# Patient Record
Sex: FEMALE | Race: BLACK OR AFRICAN AMERICAN | NOT HISPANIC OR LATINO | Employment: OTHER | URBAN - NONMETROPOLITAN AREA
[De-identification: names, ages, dates, MRNs, and addresses within clinical notes are randomized per-mention and may not be internally consistent; named-entity substitution may affect disease eponyms.]

---

## 2018-01-08 ENCOUNTER — OFFICE VISIT CONVERTED (OUTPATIENT)
Dept: FAMILY MEDICINE CLINIC | Age: 58
End: 2018-01-08
Attending: FAMILY MEDICINE

## 2018-10-23 ENCOUNTER — OFFICE VISIT CONVERTED (OUTPATIENT)
Dept: FAMILY MEDICINE CLINIC | Age: 58
End: 2018-10-23
Attending: FAMILY MEDICINE

## 2018-10-30 ENCOUNTER — OFFICE VISIT CONVERTED (OUTPATIENT)
Dept: FAMILY MEDICINE CLINIC | Age: 58
End: 2018-10-30
Attending: FAMILY MEDICINE

## 2019-02-14 ENCOUNTER — OFFICE VISIT CONVERTED (OUTPATIENT)
Dept: FAMILY MEDICINE CLINIC | Age: 59
End: 2019-02-14
Attending: FAMILY MEDICINE

## 2020-02-06 ENCOUNTER — OFFICE VISIT CONVERTED (OUTPATIENT)
Dept: FAMILY MEDICINE CLINIC | Age: 60
End: 2020-02-06
Attending: FAMILY MEDICINE

## 2021-02-08 ENCOUNTER — OFFICE VISIT CONVERTED (OUTPATIENT)
Dept: FAMILY MEDICINE CLINIC | Age: 61
End: 2021-02-08
Attending: FAMILY MEDICINE

## 2021-02-08 ENCOUNTER — HOSPITAL ENCOUNTER (OUTPATIENT)
Dept: OTHER | Facility: HOSPITAL | Age: 61
Discharge: HOME | End: 2021-02-08
Attending: FAMILY MEDICINE

## 2021-02-08 LAB
ALBUMIN SERPL-MCNC: 4 G/DL (ref 3.5–5)
ALBUMIN/GLOB SERPL: 1.1 {RATIO} (ref 1.4–2.6)
ALP SERPL-CCNC: 75 U/L (ref 43–160)
ALT SERPL-CCNC: 27 U/L (ref 10–40)
ANION GAP SERPL CALC-SCNC: 13 MMOL/L (ref 8–19)
AST SERPL-CCNC: 19 U/L (ref 15–50)
BILIRUB SERPL-MCNC: 0.18 MG/DL (ref 0.2–1.3)
BUN SERPL-MCNC: 17 MG/DL (ref 5–25)
BUN/CREAT SERPL: 15 {RATIO} (ref 6–20)
CALCIUM SERPL-MCNC: 9.6 MG/DL (ref 8.7–10.4)
CHLORIDE SERPL-SCNC: 105 MMOL/L (ref 99–111)
CONV CO2: 26 MMOL/L (ref 22–32)
CONV TOTAL PROTEIN: 7.7 G/DL (ref 6.3–8.2)
CREAT UR-MCNC: 1.11 MG/DL (ref 0.5–0.9)
ERYTHROCYTE [DISTWIDTH] IN BLOOD BY AUTOMATED COUNT: 14.6 % (ref 11.5–14.5)
FERRITIN SERPL-MCNC: 283 NG/ML (ref 10–200)
FOLATE SERPL-MCNC: 9.3 NG/ML (ref 4.8–20)
GFR SERPLBLD BASED ON 1.73 SQ M-ARVRAT: >60 ML/MIN/{1.73_M2}
GLOBULIN UR ELPH-MCNC: 3.7 G/DL (ref 2–3.5)
GLUCOSE SERPL-MCNC: 94 MG/DL (ref 65–99)
HBA1C MFR BLD: 13.3 G/DL (ref 12–16)
HCT VFR BLD AUTO: 41.3 % (ref 37–47)
IRON SATN MFR SERPL: 25 % (ref 20–55)
IRON SERPL-MCNC: 71 UG/DL (ref 60–170)
MCH RBC QN AUTO: 26.7 PG (ref 27–31)
MCHC RBC AUTO-ENTMCNC: 32.2 G/DL (ref 33–37)
MCV RBC AUTO: 82.8 FL (ref 81–99)
OSMOLALITY SERPL CALC.SUM OF ELEC: 291 MOSM/KG (ref 273–304)
PLATELET # BLD AUTO: 321 10*3/UL (ref 130–400)
PMV BLD AUTO: 9.5 FL (ref 7.4–10.4)
POTASSIUM SERPL-SCNC: 4 MMOL/L (ref 3.5–5.3)
RBC # BLD AUTO: 4.99 10*6/UL (ref 4.2–5.4)
RPR SER QL: NORMAL
SODIUM SERPL-SCNC: 140 MMOL/L (ref 135–147)
T4 FREE SERPL-MCNC: 1.1 NG/DL (ref 0.9–1.8)
TIBC SERPL-MCNC: 285 UG/DL (ref 245–450)
TRANSFERRIN SERPL-MCNC: 199 MG/DL (ref 250–380)
TSH SERPL-ACNC: 0.84 M[IU]/L (ref 0.27–4.2)
VIT B12 SERPL-MCNC: 1072 PG/ML (ref 211–911)
WBC # BLD AUTO: 5.16 10*3/UL (ref 4.8–10.8)

## 2021-02-09 LAB — B BURGDOR IGG+IGM SER-ACNC: <0.91 ISR (ref 0–0.9)

## 2021-03-22 ENCOUNTER — HOSPITAL ENCOUNTER (OUTPATIENT)
Dept: OTHER | Facility: HOSPITAL | Age: 61
Discharge: HOME | End: 2021-03-22
Attending: FAMILY MEDICINE

## 2021-03-22 LAB
CREAT BLD-MCNC: 1.1 MG/DL (ref 0.6–1.4)
GFR SERPLBLD BASED ON 1.73 SQ M-ARVRAT: >60 ML/MIN/{1.73_M2}

## 2021-05-14 ENCOUNTER — HOSPITAL ENCOUNTER (OUTPATIENT)
Dept: OTHER | Facility: HOSPITAL | Age: 61
Discharge: HOME | End: 2021-05-14
Attending: FAMILY MEDICINE

## 2021-05-14 ENCOUNTER — OFFICE VISIT CONVERTED (OUTPATIENT)
Dept: FAMILY MEDICINE CLINIC | Age: 61
End: 2021-05-14
Attending: FAMILY MEDICINE

## 2021-05-18 NOTE — PROGRESS NOTES
Cindy DengPatricia 1960     Office/Outpatient Visit    Visit Date: Tue, Oct 30, 2018 01:10 pm    Provider: Alex Coe MD (Assistant: Sherri Sheldon RN)    Location: [facility]        Electronically signed by Alex Coe MD on  11/01/2018 07:28:27 AM                             SUBJECTIVE:        CC: 'I'm having problems with hemorrhoids'         HPI:     Cindy is in today for follow up on hemorrhoids.  She has had an issue with them for a long period of time.  However, they do seem to be worsening.  She says that her most recent colonoscopy was this year.  She has not previously had any kind of surgical treatment of hemorrhoids.  She says that they are tending to stay longer and be more painful.         Cindy has also noted some issue with her memory.  She feels like she is having more of a problem than she should.  She finds that she struggles with words.  She denies issues with head injuries.  Cindy says that memory issues do not run in the family.  This has been more of an issue this year.  She is struggling to remember simple things.  She struggles with finding words.  She denies difficulty with speech or swallowing.  She denies numbness or weakness on one side or the other.      ROS:     CONSTITUTIONAL:  Negative for chills and fever.      CARDIOVASCULAR:  Negative for chest pain and palpitations.      RESPIRATORY:  Negative for recent cough and dyspnea.      GASTROINTESTINAL:  Negative for abdominal pain, nausea and vomiting.      INTEGUMENTARY/BREAST:  Negative for atypical mole(s) and rash.      PSYCHIATRIC:  Negative for anxiety and depression.          PM/[facility]/:     Last Reviewed on 10/30/2018 01:43 PM by Alex Coe    Past Medical History:         Hypertension     Depression    Anxiety         Surgical History:         5th Metatarsal; Procedures: colonoscopy 2012 - hemorrhoid - no polyps         Family History:         Positive for Colon Cancer ( father )  and Prostate Cancer ( father ).      Positive for Renal Failure ( mother ).          Social History:     Occupation: Retired (Prior occupation: [employer])     Marital Status:      Children: 1 child         Tobacco/Alcohol/Supplements:     Last Reviewed on 10/30/2018 01:43 PM by Alex Coe    Tobacco: She has a past history of cigarette smoking; quit date:  1990s.          Alcohol: Frequency:    'I drink wine';         Substance Abuse History:     Last Reviewed on 10/30/2018 01:43 PM by Alex Coe    NEGATIVE         Mental Health History:     Last Reviewed on 10/30/2018 01:43 PM by Alex Coe        Communicable Diseases (eg STDs):     Last Reviewed on 10/30/2018 01:43 PM by Alex Coe            Current Problems:     Last Reviewed on 10/30/2018 01:43 PM by Alex Coe    Memory loss     Hemorrhoids, external     Cellulitis of the face     Use of high risk medications     Depression     Hypertension         Immunizations:     Fluarix pf 1/14/2013     Fluzone Quadrivalent (3+ years) 10/23/2018         Allergies:     Last Reviewed on 10/30/2018 01:43 PM by Alex Coe      No Known Drug Allergies.     Lisinopril: cough (Adverse Reaction)        Current Medications:     Last Reviewed on 10/30/2018 01:43 PM by Alex Coe    Wellbutrin XL 150mg Tablets, Extended Release Take 1 tablet(s) by mouth daily     Amlodipine  5mg Tablet Take 1 tablet(s) by mouth daily     Losartan 50mg Tablet     Trazodone HCl 100mg Tablet         OBJECTIVE:        Vitals:         Current: 10/30/2018 1:14:38 PM    Ht:  5 ft, 5 in;  Wt: 179 lbs;  BMI: 29.8    T: 97.9 F (oral);  BP: 141/84 mm Hg (left arm, sitting);  P: 57 bpm (left arm (BP Cuff), sitting);  sCr: 0.9 mg/dL;  GFR: 71.99        Exams:     PHYSICAL EXAM:     GENERAL: vital signs recorded - well developed, well nourished;  no apparent distress;     EYES: extraocular movements intact; conjunctiva and  cornea are normal; PERRLA;     E/N/T:  normal EACs, TMs, nasal/oral mucosa, teeth, gingiva, and oropharynx;     NECK: range of motion is normal; thyroid is non-palpable;     RESPIRATORY: normal respiratory rate and pattern with no distress; normal breath sounds with no rales, rhonchi, wheezes or rubs;     CARDIOVASCULAR: normal rate; rhythm is regular;  no systolic murmur; no edema;     GASTROINTESTINAL: nontender; normal bowel sounds; no organomegaly;     LYMPHATIC: no enlargement of cervical or facial nodes; no supraclavicular nodes;     BREAST/INTEGUMENT: SKIN: no significant rashes or lesions; no suspicious moles;     NEUROLOGIC: mental status: alert and oriented x 3; cranial nerves II-XII grossly intact; there is not any focal weakness;     PSYCHIATRIC: appropriate affect and demeanor; normal psychomotor function;         ASSESSMENT           455.3   K64.8  Hemorrhoids, external              DDx:     780.93   R41.3  Memory loss              DDx:     784.3   R47.01  Verbal aphasia              DDx:         ORDERS:         Radiology/Test Orders:       60014  MRI, brain; with contrast  (Send-Out)         82328  MRI, brain; without contrast  (Send-Out)           Lab Orders:       94823  B12FO - [facility] Vitamin B12 with Folate  (Send-Out)         75461  BDCB2 - [facility] CBC w/o diff  (Send-Out)         00498  COMP - [facility] Comp. Metabolic Panel  (Send-Out)         24558  RPR - [facility] RPR, Rfx Qn RPR/Confirm TP  (Send-Out)         15447  TSH - [facility] TSH  (Send-Out)           Procedures Ordered:       REFER  Referral to Specialist or Other Facility  (Send-Out)           Other Orders:         Calculated BMI above the upper parameter and a follow-up plan was documented in the medical record  (In-House)                   PLAN:          Hemorrhoids, external         REFERRALS:  Referral initiated to a general surgeon ( Dr. Wally Tello ).      RECOMMENDATIONS given include: Today, we have reviewed her care.  We will refer her for  evaluation on the hemorrhoids.  She is already doing the simple things that we would otherwise recommend.  The memory and speech issues are more concerning.  There is not an obvious cause on exam or from her medications.  We will move forward with labs and MRI of the brain as noted.  Given her concern, consider referral to neurology depending on what the testing shows..  MIPS     BMI Elevated - Follow-Up Plan: She was provided education on weight loss strategies           Orders:       REFER  Referral to Specialist or Other Facility  (Send-Out)           Calculated BMI above the upper parameter and a follow-up plan was documented in the medical record  (In-House)             Patient Education Handouts:       Hemorrhoids           Memory loss     LABORATORY:  Labs ordered to be performed today include B12 with Folate, CBC W/O DIFF, Comprehensive metabolic panel, RPR, and TSH.            Orders:       86032  B12FO - [facility] Vitamin B12 with Folate  (Send-Out)         19339  BDCB2 - [facility] CBC w/o diff  (Send-Out)         58289  COMP - [facility] Comp. Metabolic Panel  (Send-Out)         91056  RPR - [facility] RPR, Rfx Qn RPR/Confirm TP  (Send-Out)         72401  TSH - [facility] TSH  (Send-Out)            Verbal aphasia         RADIOLOGY:  I have ordered a head MRI with contrast without contrast to be done today.            Orders:       67637  MRI, brain; with contrast  (Send-Out)         53306  MRI, brain; without contrast  (Send-Out)               CHARGE CAPTURE           **Please note: ICD descriptions below are intended for billing purposes only and may not represent clinical diagnoses**        Primary Diagnosis:         455.3 Hemorrhoids, external            K64.8    Other hemorrhoids              Orders:          56405   Office/outpatient visit; established patient, level 4  (In-House)                Calculated BMI above the upper parameter and a follow-up plan was documented in the medical record  (In-House)           780.93  Memory loss            R41.3    Other amnesia    784.3 Verbal aphasia            R47.01    Aphasia        ADDENDUMS:      ____________________________________    Addendum: 11/01/2018 08:17 AM - Priscila Tran         Visit Note Faxed to:        Wally Tello  (General Surgery); Number [phone]            Date: 11/12/2018 01:32 PM    Author: Bernadette Medina         Visit Note Faxed to:        Luis Lyles (Neurology); Number [phone]

## 2021-05-18 NOTE — PROGRESS NOTES
Cindy DengPatricia 1960     Office/Outpatient Visit    Visit Date: Mon, Jan 8, 2018 11:46 am    Provider: Alex Coe MD (Assistant: Deana Maurice LPN)    Location: [facility]        Electronically signed by Alex Coe MD on  01/08/2018 07:44:58 PM                             SUBJECTIVE:        CC: 'referral for my eyes'         HPI:     Cindy is in today for evaluation of her L eye.  She has noted some discomfort in the L eye.  This has been an issue for 5-6 months.  She says that she feels like she has to close the eye due to some irritation several times per day.  It bothers her when she drives.  She has some concern that her vision is off in the eye.  She did have eye exam less than a year ago.      ROS:     CONSTITUTIONAL:  Negative for chills and fever.      CARDIOVASCULAR:  Negative for chest pain and palpitations.      RESPIRATORY:  Negative for recent cough and dyspnea.      GASTROINTESTINAL:  Negative for abdominal pain, nausea and vomiting.          PM/[facility]/:     Last Reviewed on 1/08/2018 11:53 AM by Alex Coe    Past Medical History:         Hypertension     Depression    Anxiety         Surgical History:         5th Metatarsal; Procedures: colonoscopy 2012 - hemorrhoid - no polyps         Family History:         Positive for Colon Cancer ( father ) and Prostate Cancer ( father ).      Positive for Renal Failure ( mother ).          Social History:     Occupation: Retired (Prior occupation: [employer])     Marital Status:      Children: 1 child         Tobacco/Alcohol/Supplements:     Last Reviewed on 1/08/2018 11:53 AM by Alex Ceo    Tobacco: She has a past history of cigarette smoking; quit date:  1990s.          Alcohol: Frequency:    'I drink wine';         Substance Abuse History:     Last Reviewed on 1/08/2018 11:53 AM by Alex Coe    NEGATIVE         Mental Health History:     Last Reviewed on 1/08/2018 11:53 AM by  Alex Coe        Communicable Diseases (eg STDs):     Last Reviewed on 1/08/2018 11:53 AM by Alex Coe            Current Problems:     Last Reviewed on 1/08/2018 11:53 AM by Alex Coe    Use of high risk medications     Depression     Hypertension         Immunizations:     Fluarix pf 1/14/2013         Allergies:     Last Reviewed on 1/08/2018 11:53 AM by Alex Coe      No Known Drug Allergies.     Lisinopril: cough (Adverse Reaction)        Current Medications:     Last Reviewed on 1/08/2018 11:53 AM by Alex Coe    Wellbutrin XL 150mg Tablets, Extended Release Take 1 tablet(s) by mouth daily     Ibuprofen 800mg Tablet Take 1 tablet(s) by mouth q 8hr prn     Amlodipine  5mg Tablet Take 1 tablet(s) by mouth daily     Melatonin 3mg Tablet prn     Losartan 50mg Tablet     Trazodone HCl 100mg Tablet         OBJECTIVE:        Vitals:         Current: 1/8/2018 11:51:02 AM    Ht:  5 ft, 5 in;  Wt: 175.3 lbs;  BMI: 29.2    T: 98.2 F (oral);  BP: 138/89 mm Hg (left arm, sitting);  P: 62 bpm (left arm (BP Cuff), sitting);  sCr: 0.9 mg/dL;  GFR: 72.22        Exams:     PHYSICAL EXAM:     GENERAL: vital signs recorded - well developed, well nourished;  no apparent distress;     EYES: extraocular movements intact; conjunctiva and cornea are normal; PERRLA;     E/N/T:  normal EACs, TMs, nasal/oral mucosa, teeth, gingiva, and oropharynx;     NECK: range of motion is normal; thyroid exam reveals enlargement;     RESPIRATORY: normal respiratory rate and pattern with no distress; normal breath sounds with no rales, rhonchi, wheezes or rubs;     CARDIOVASCULAR: normal rate; rhythm is regular;  no systolic murmur; no edema;     GASTROINTESTINAL: nontender; normal bowel sounds; no organomegaly;     BREAST/INTEGUMENT: SKIN: no significant rashes or lesions; no suspicious moles;         ASSESSMENT           379.91   H57.12  Eye pain              DDx:         ORDERS:          Radiology/Test Orders:       2022F  Dilated retinal eye exam w/interpret by ophthalmologist/optometrist documented/reviewed (DM)4  (In-House)           Procedures Ordered:       REFER  Referral to Specialist or Other Facility  (Send-Out)                   PLAN:          Eye pain         REFERRALS:  Referral initiated to Optometrist Tono.      RECOMMENDATIONS given include: Cindy is doing well at this time.  We will refer her for evaluation of the eye.  Consider labs or other evaluation depending on Dr. Power's evaluation..            Orders:       REFER  Referral to Specialist or Other Facility  (Send-Out)         2022F  Dilated retinal eye exam w/interpret by ophthalmologist/optometrist documented/reviewed (DM)4  (In-House)             Patient Education Handouts:       [facility] Medication Compliance              Patient Recommendations:        For  Eye pain:     I also recommend Tono.              CHARGE CAPTURE           **Please note: ICD descriptions below are intended for billing purposes only and may not represent clinical diagnoses**        Primary Diagnosis:         379.91 Eye pain            H57.12    Ocular pain, left eye              Orders:          91154   Office/outpatient visit; established patient, level 3  (In-House)             2022F   Dilated retinal eye exam w/interpret by ophthalmologist/optometrist documented/reviewed (DM)4  (In-House)

## 2021-05-18 NOTE — PROGRESS NOTES
Cindy Deng 1960     Office/Outpatient Visit    Visit Date: Tue, Oct 23, 2018 11:13 am    Provider: Crut Higgins,   (Assistant: Kelly Lucas MA)    Location: [facility]        Electronically signed by Curt Higgins,   on  10/23/2018 06:11:30 PM                             SUBJECTIVE:        CC:     Mrs. Deng is a 58 year old Black or  female.  She presents with Left eye swollen & matted shut this am. Pt stated that she has not done anything different.  (DISCUSS FLU VACCINE)         HPI:     Left upper eyelid is swollen, vision is slightly blurry but otherwise unaffected. No pain in eye and no diplopia.     ROS:     CONSTITUTIONAL:  Negative for fatigue and fever.      EYES:  Positive for blurred vision ( left eye ).      E/N/T:  Negative for diminished hearing and nasal congestion.      CARDIOVASCULAR:  Negative for chest pain and palpitations.      RESPIRATORY:  Negative for recent cough and dyspnea.      GASTROINTESTINAL:  Negative for abdominal pain, constipation, diarrhea, nausea and vomiting.      GENITOURINARY:  Negative for dysuria and urinary incontinence.      MUSCULOSKELETAL:  Negative for arthralgias and myalgias.      INTEGUMENTARY/BREAST:  Negative for atypical mole(s) and rash.      NEUROLOGICAL:  Negative for paresthesias and weakness.      PSYCHIATRIC:  Negative for anxiety, depression and sleep disturbance.          [facility]/FM/:     Last Reviewed on 1/08/2018 11:53 AM by Alex Coe    Past Medical History:         Hypertension     Depression    Anxiety         Surgical History:         5th Metatarsal; Procedures: colonoscopy 2012 - hemorrhoid - no polyps         Family History:         Positive for Colon Cancer ( father ) and Prostate Cancer ( father ).      Positive for Renal Failure ( mother ).          Social History:     Occupation: Retired (Prior occupation: [employer])     Marital Status:      Children: 1 child          Tobacco/Alcohol/Supplements:     Last Reviewed on 1/08/2018 11:53 AM by Alex Coe    Tobacco: She has a past history of cigarette smoking; quit date:  1990s.          Alcohol: Frequency:    'I drink wine';         Substance Abuse History:     Last Reviewed on 1/08/2018 11:53 AM by Alex Coe    NEGATIVE         Mental Health History:     Last Reviewed on 1/08/2018 11:53 AM by Alex Coe        Communicable Diseases (eg STDs):     Last Reviewed on 1/08/2018 11:53 AM by Alex Coe            Current Problems:     Last Reviewed on 1/08/2018 11:53 AM by Alex Coe    Use of high risk medications     Depression     Hypertension         Immunizations:     Fluarix pf 1/14/2013         Allergies:     Last Reviewed on 1/08/2018 11:53 AM by Alex Coe      No Known Drug Allergies.     Lisinopril: cough (Adverse Reaction)        Current Medications:     Last Reviewed on 1/08/2018 11:53 AM by Alex Coe    Wellbutrin XL 150mg Tablets, Extended Release Take 1 tablet(s) by mouth daily     Ibuprofen 800mg Tablet Take 1 tablet(s) by mouth q 8hr prn     Amlodipine  5mg Tablet Take 1 tablet(s) by mouth daily     Losartan 50mg Tablet     Trazodone HCl 100mg Tablet     Melatonin 3mg Tablet prn         OBJECTIVE:        Vitals:         Current: 10/23/2018 11:19:10 AM    Ht:  5 ft, 5 in;  Wt: 174.4 lbs;  BMI: 29.0    T: 98.5 F (oral);  BP: 149/91 mm Hg (right arm, sitting);  P: 59 bpm (right arm (BP Cuff), sitting);  sCr: 0.9 mg/dL;  GFR: 71.20        Repeat:     11:22:26 AM     BP:   138/88mm Hg (left arm, sitting)         Exams:     PHYSICAL EXAM:     GENERAL: vital signs recorded - well developed, well nourished;  well groomed;  no apparent distress;     EYES: left upper lid blepharitis;  extraocular movements intact; conjunctiva and cornea are normal; PERRLA;     E/N/T: OROPHARYNX:  normal mucosa, dentition, gingiva, and posterior pharynx;     NECK:  range of motion is normal; thyroid exam reveals not enlarged;     RESPIRATORY: normal respiratory rate and pattern with no distress; normal breath sounds with no rales, rhonchi, wheezes or rubs;     CARDIOVASCULAR: normal rate; rhythm is regular;  no systolic murmur; no edema;     GASTROINTESTINAL: nontender; normal bowel sounds;     MUSCULOSKELETAL: normal gait; normal overall tone     NEUROLOGIC: mental status: alert and oriented x 3;     PSYCHIATRIC:  appropriate affect and demeanor; normal speech pattern; grossly normal memory;         Procedures:     Vaccination against other viral diseases, Influenza     1. Influenza, seasonal PF (children 3 years to adult): 0.5 ml unit dose given IM in the right upper arm; administered by mlb;  lot number [id number]; expires June 30, 2019 Regarding contraindications to an Influenza vaccine: Denies moderate/severe illness with/without fever; serious reaction to eggs, egg proteins, gentamicin, gelatin, arginine, neomycin or polymixin; serious reaction after recieving previous influenza vaccines; and history of Guillain-Sturgeon Syndrome.              ASSESSMENT           682.0   L03.211  Cellulitis of the face              DDx:     V04.81   Z23  Vaccination against other viral diseases, Influenza              DDx:         ORDERS:         Meds Prescribed:       Bactrim DS (Trimethoprim/Sulfamethoxazole ) Tablet Take 1 tablet(s) by mouth q12h for 10 days  #20 (Twenty) tablet(s) Refills: 0         Procedures Ordered:       21359  Immunization administration; one vaccine  (In-House)           Other Orders:       21540  Influenza virus vaccine, quadrivalent, split virus, preservative free 3 years of age & older  (In-House)                   PLAN:          [facility]. BMP could be drawn then to assess for kidney injury from bactrim. Presentation c/w preseptal cellulitis. Not concerning for post-septal or orbital as no eye pain  and EOMI. Pt  counseled to go to ER if these Sx develop.           Prescriptions:       Bactrim DS (Trimethoprim/Sulfamethoxazole ) Tablet Take 1 tablet(s) by mouth q12h for 10 days  #20 (Twenty) tablet(s) Refills: 0          Vaccination against other viral diseases, Influenza     MIPS Vaccines Flu and Pneumonia updated in Shot record           Orders:       46523  Immunization administration; one vaccine  (In-House)         30851  Influenza virus vaccine, quadrivalent, split virus, preservative free 3 years of age & older  (In-House)               CHARGE CAPTURE           **Please note: ICD descriptions below are intended for billing purposes only and may not represent clinical diagnoses**        Primary Diagnosis:         682.0 Cellulitis of the face            L03.211    Cellulitis of face              Orders:          46583   Office/outpatient visit; established patient, level 3  (In-House)           V04.81 Vaccination against other viral diseases, Influenza            Z23    Encounter for immunization              Orders:          81071   Immunization administration; one vaccine  (In-House)             03995   Influenza virus vaccine, quadrivalent, split virus, preservative free 3 years of age & older  (In-House)

## 2021-05-18 NOTE — PROGRESS NOTES
Cindy Deng  1960     Office/Outpatient Visit    Visit Date: Fri, May 14, 2021 01:48 pm    Provider: Alex Coe MD (Assistant: Sherri Sheldon RN)    Location: [facility]        Electronically signed by Alex Coe MD on  05/15/2021 06:24:53 AM                             Subjective:        CC: thumb pain    HPI:       Cindy has been having pain in the R hand for the last couple of weeks.  The pain is located around the thumb joints.  She is not aware of any injury that may have caused this.  She has not had any unusual activity that might explain this.  She says the pain is getting in the way of normal activity for her.  She is not currently taking any medication for this.    ROS:     CONSTITUTIONAL:  Negative for chills and fever.      CARDIOVASCULAR:  Negative for chest pain and palpitations.      RESPIRATORY:  Negative for recent cough and dyspnea.      GASTROINTESTINAL:  Negative for abdominal pain, nausea and vomiting.      INTEGUMENTARY/BREAST:  Negative for atypical mole(s) and rash.          Past Medical History / Family History / Social History:         Last Reviewed on 5/14/2021 02:04 PM by Alex Coe    Past Medical History:         Hypertension     Depression    Anxiety         Surgical History:         5th Metatarsal; Procedures: colonoscopy 2012 - hemorrhoid - no polyps         Family History:         Positive for Colon Cancer ( father ) and Prostate Cancer ( father ).      Positive for Renal Failure ( mother ).          Social History:     Occupation: Retired (Prior occupation: [employer])     Marital Status:      Children: 1 child         Tobacco/Alcohol/Supplements:     Last Reviewed on 5/14/2021 02:04 PM by Alex Coe    Tobacco: She has a past history of cigarette smoking; quit date:  1990s.          Alcohol: Frequency:    'I drink wine';         Substance Abuse History:     Last Reviewed on 5/14/2021 02:04 PM by Saravanan  Alex Strickland    NEGATIVE         Mental Health History:     Last Reviewed on 5/14/2021 02:04 PM by Alex Coe        Communicable Diseases (eg STDs):     Last Reviewed on 5/14/2021 02:04 PM by Alex Coe        Current Problems:     Last Reviewed on 5/14/2021 02:04 PM by Alex Coe    Major depressive disorder, single episode, moderate    Cellulitis of face    Other hemorrhoids    Other amnesia    Cough    Aphasia    Other fatigue    Pain in right hand    Pain in left foot        Immunizations:     influenza, injectable, quadrivalent, preservative free 10/23/2019    Fluarix pf 1/14/2013    Fluzone Quadrivalent (3+ years) 10/23/2018    influenza, injectable, quadrivalent, preservative free 10/1/2020        Allergies:     Last Reviewed on 5/14/2021 02:04 PM by Alex Coe    Lisinopril: cough  (Adverse Reaction)        Current Medications:     Last Reviewed on 5/14/2021 02:04 PM by Alex Coe    Wellbutrin XL 150mg Tablets, Extended Release [Take 1 tablet(s) by mouth daily]    Amlodipine  5 mg oral tablet [Take 1 tablet(s) by mouth daily]    Losartan 50 mg oral tablet    traZODone 100 mg oral tablet    Singulair 10 mg oral tablet [Take 1 tablet(s) by mouth each evening]    benzonatate 200 mg oral capsule [take 1 capsule (200 mg) by oral route 3 times per day as needed]        Objective:        Vitals:         Current: 5/14/2021 1:52:47 PM    Ht:  5 ft, 5 in;  Wt: 181.2 lbs;  BMI: 30.2T: 97.5 F (temporal);  BP: 129/81 mm Hg (left arm, sitting);  P: 62 bpm (left arm (BP Cuff), sitting);  sCr: 1.11 mg/dL;  GFR: 57.25        Exams:     PHYSICAL EXAM:     GENERAL: vital signs recorded - well developed, well nourished;  no apparent distress;     NECK: range of motion is normal; thyroid is non-palpable;     RESPIRATORY: normal respiratory rate and pattern with no distress; normal breath sounds with no rales, rhonchi, wheezes or rubs;     CARDIOVASCULAR: normal rate;  rhythm is regular;  no systolic murmur; no edema;     GASTROINTESTINAL: nontender; normal bowel sounds; no organomegaly;     LYMPHATIC: no enlargement of cervical or facial nodes; no supraclavicular nodes;     BREAST/INTEGUMENT: SKIN: no significant rashes or lesions; no suspicious moles;     MUSCULOSKELETAL: there is some mild discomfort of the R first MCP joint - no obvious edema is apparent;         Assessment:         M79.641   Pain in right hand           ORDERS:         Meds Prescribed:       [New Rx] predniSONE 20 mg oral tablet [take 2 tablets (40 mg) by oral route once daily], #10 (ten) tablets, Refills: 0 (zero)         Radiology/Test Orders:       [id number]  Right radiologic examination; hand; minimum of three views  (Send-Out)                      Plan:         Pain in right hand        RADIOLOGY:  I have ordered a Right Hand hand x-ray to be done today.      RECOMMENDATIONS given include: We will cover Cindy as noted below.  Risks of steroids are reviewed, but we will limit how long she takes.  I hope we can get her feeling better quickly.  X-ray as noted..            Prescriptions:       [New Rx] predniSONE 20 mg oral tablet [take 2 tablets (40 mg) by oral route once daily], #10 (ten) tablets, Refills: 0 (zero)           Orders:       [id number]  Right radiologic examination; hand; minimum of three views  (Send-Out)                  Charge Capture:         Primary Diagnosis:     M79.641  Pain in right hand           Orders:      [id number]  Office/outpatient visit; established patient, level 3  (In-House)

## 2021-05-18 NOTE — PROGRESS NOTES
Cindy Deng  1960     Office/Outpatient Visit    Visit Date: Thu, Feb 6, 2020 01:23 pm    Provider: Alex Coe MD (Assistant: Sherri Sheldon RN)    Location: [facility]        Electronically signed by Alex Coe MD on  02/07/2020 05:46:07 AM                             Subjective:        CC: cough    HPI:           Patient complains of cough.  It has been present for the past 6 weeks.  Respiratory symptoms include chest congestion and cough.   She denies shortness of breath or wheezing.  She denies body aches, fever, nasal congestion and sinus pain/pressure.  Sputum is described as a little tint to it - white to yellow.  She denies exposure to ill contacts.  She has already tried to relieve the symptoms with Mucinex.      ROS:     CONSTITUTIONAL:  Negative for chills and fever.      CARDIOVASCULAR:  Negative for chest pain and palpitations.      GASTROINTESTINAL:  Negative for abdominal pain, nausea and vomiting.      INTEGUMENTARY/BREAST:  Negative for atypical mole(s) and rash.          Past Medical History / Family History / Social History:         Last Reviewed on 2/06/2020 01:32 PM by Alex Coe    Past Medical History:         Hypertension     Depression    Anxiety         Surgical History:         5th Metatarsal; Procedures: colonoscopy 2012 - hemorrhoid - no polyps         Family History:         Positive for Colon Cancer ( father ) and Prostate Cancer ( father ).      Positive for Renal Failure ( mother ).          Social History:     Occupation: Retired (Prior occupation: [employer])     Marital Status:      Children: 1 child         Tobacco/Alcohol/Supplements:     Last Reviewed on 2/06/2020 01:32 PM by Alex Coe    Tobacco: She has a past history of cigarette smoking; quit date:  1990s.          Alcohol: Frequency:    'I drink wine';         Substance Abuse History:     Last Reviewed on 2/06/2020 01:32 PM by Alex Coe     NEGATIVE         Mental Health History:     Last Reviewed on 2/06/2020 01:32 PM by Alex Coe        Communicable Diseases (eg STDs):     Last Reviewed on 2/06/2020 01:32 PM by Alex Coe        Current Problems:     Last Reviewed on 2/06/2020 01:32 PM by Alex Coe    Major depressive disorder, single episode, moderate    Cellulitis of face    Other hemorrhoids    Other amnesia    Cough        Immunizations:     Fluarix pf 1/14/2013    Fluzone Quadrivalent (3+ years) 10/23/2018    influenza, injectable, quadrivalent, preservative free 10/23/2019        Allergies:     Last Reviewed on 2/06/2020 01:32 PM by Alex Coe    No Known Allergies.    Lisinopril: cough  (Adverse Reaction)        Current Medications:     Last Reviewed on 2/06/2020 01:32 PM by Alex Coe    Wellbutrin XL 150mg Tablets, Extended Release [Take 1 tablet(s) by mouth daily]    Amlodipine  5mg Tablet [Take 1 tablet(s) by mouth daily]    Losartan 50mg Tablet    Trazodone HCl 100mg Tablet        Objective:        Vitals:         Current: 2/6/2020 1:27:38 PM    Ht:  5 ft, 5 in;  Wt: 183.6 lbs;  BMI: 30.6T: 98 F (oral);  BP: 139/88 mm Hg (left arm, sitting);  P: 84 bpm (left arm (BP Cuff), sitting);  sCr: 1.04 mg/dL;  GFR: 62.21        Exams:     PHYSICAL EXAM:     GENERAL: vital signs recorded - well developed, well nourished;  no apparent distress;     EYES: extraocular movements intact; conjunctiva and cornea are normal; PERRL;     E/N/T:  normal EACs, TMs, nasal/oral mucosa, teeth, gingiva, and oropharynx;     NECK: range of motion is normal; thyroid is non-palpable;     RESPIRATORY: normal respiratory rate and pattern with no distress; normal breath sounds with no rales, rhonchi, wheezes or rubs;     CARDIOVASCULAR: normal rate; rhythm is regular;  no systolic murmur; no edema;     GASTROINTESTINAL: nontender; normal bowel sounds; no organomegaly;     LYMPHATIC: no enlargement of cervical  or facial nodes; no supraclavicular nodes;     BREAST/INTEGUMENT: SKIN: no significant rashes or lesions; no suspicious moles;         Assessment:         R05   Cough           ORDERS:         Meds Prescribed:       [New Rx] Singulair 10 mg oral tablet [Take 1 tablet(s) by mouth each evening], #30 (thirty) tablets, Refills: 0 (zero)       [New Rx] benzonatate 200 mg oral capsule [take 1 capsule (200 mg) by oral route 3 times per day as needed], #24 (twenty four) capsules, Refills: 0 (zero)         Other Orders:         Depression screen negative  (In-House)                      Plan:         Cough        RECOMMENDATIONS given include: Her exam is reassuring today.  Her lungs sound good.  We will give her a couple of medications to see if they help with what she is experiencing.  If she does not see improvement in the next 2 weeks, I have asked her to call me back for additional guidance.  I would lean toward getting a CXR at that time..  MIPS PHQ-9 Depression Screening: Completed form scanned and in chart; Total Score 4; Negative Depression Screen           Prescriptions:       [New Rx] Singulair 10 mg oral tablet [Take 1 tablet(s) by mouth each evening], #30 (thirty) tablets, Refills: 0 (zero)       [New Rx] benzonatate 200 mg oral capsule [take 1 capsule (200 mg) by oral route 3 times per day as needed], #24 (twenty four) capsules, Refills: 0 (zero)           Orders:         Depression screen negative  (In-House)                Patient Education Handouts:       Cough              Charge Capture:         Primary Diagnosis:     R05  Cough           Orders:      [id number]  Office/outpatient visit; established patient, level 3  (In-House)              Depression screen negative  (In-House)

## 2021-05-18 NOTE — PROGRESS NOTES
Cindy Deng. 1960     Office/Outpatient Visit    Visit Date: Thu, Feb 14, 2019 01:53 pm    Provider: Alex Coe MD (Assistant: Deana Maurice LPN)    Location: [facility]        Electronically signed by Alex Coe MD on  02/15/2019 08:01:29 AM                             SUBJECTIVE:        CC: 'I need a referral to a podiatrist'         HPI:     Cindy is in today for follow up on foot pain.  She has been seeing podiatry for plantar fasciitis.  However, she has noted some worsening first MTP pain and would like to be seen again for evaluation and possible injection.  She has not had any recent injury that might explain this, and she does not think gout is the issue.         She says that she has been struggling at home.  She says that her family is falling apart.  Her daughter is a do in college.  She says that all of the relationships are strained at home.  She and her  are not getting along.  They are both not getting along with their daughter.  She says that there have been some verbal exchanges.  She denies physical abuse at home.  She denies any intention to harm herself.  She is on antidepressant already.  She is wanting to consider some counseling if possible.      ROS:     CONSTITUTIONAL:  Negative for chills and fever.      CARDIOVASCULAR:  Negative for chest pain and palpitations.      RESPIRATORY:  Negative for recent cough and dyspnea.      GASTROINTESTINAL:  Negative for abdominal pain, nausea and vomiting.          PM/FM/SH:     Last Reviewed on 2/14/2019 02:22 PM by Alex Coe    Past Medical History:         Hypertension     Depression    Anxiety         Surgical History:         5th Metatarsal; Procedures: colonoscopy 2012 - hemorrhoid - no polyps         Family History:         Positive for Colon Cancer ( father ) and Prostate Cancer ( father ).      Positive for Renal Failure ( mother ).          Social History:     Occupation: Retired  (Prior occupation: [employer])     Marital Status:      Children: 1 child         Tobacco/Alcohol/Supplements:     Last Reviewed on 2/14/2019 02:22 PM by Alex Coe    Tobacco: She has a past history of cigarette smoking; quit date:  1990s.          Alcohol: Frequency:    'I drink wine';         Substance Abuse History:     Last Reviewed on 2/14/2019 02:22 PM by Alex Ceo    NEGATIVE         Mental Health History:     Last Reviewed on 2/14/2019 02:22 PM by Alex Coe        Communicable Diseases (eg STDs):     Last Reviewed on 2/14/2019 02:22 PM by Alex Coe            Current Problems:     Last Reviewed on 2/14/2019 02:22 PM by Alex Coe    Memory loss     Hemorrhoids, external     Cellulitis of the face     Use of high risk medications     Depression     Hypertension     Joint pain, ankle and foot         Immunizations:     Fluarix pf 1/14/2013     Fluzone Quadrivalent (3+ years) 10/23/2018         Allergies:     Last Reviewed on 2/14/2019 02:22 PM by Alex Coe      No Known Drug Allergies.     Lisinopril: cough (Adverse Reaction)        Current Medications:     Last Reviewed on 2/14/2019 02:22 PM by Alex Coe    Wellbutrin XL 150mg Tablets, Extended Release Take 1 tablet(s) by mouth daily     Losartan 50mg Tablet     Trazodone HCl 100mg Tablet     Amlodipine  5mg Tablet Take 1 tablet(s) by mouth daily         OBJECTIVE:        Vitals:         Current: 2/14/2019 1:57:06 PM    Ht:  5 ft, 5 in;  Wt: 183 lbs;  BMI: 30.5    T: 98.1 F (oral);  BP: 137/82 mm Hg (left arm, sitting);  P: 77 bpm (left arm (BP Cuff), sitting);  sCr: 1.04 mg/dL;  GFR: 62.89        Exams:     PHYSICAL EXAM:     GENERAL: vital signs recorded - well developed, well nourished;  no apparent distress, tired-appearing, tearful;     EYES: extraocular movements intact; conjunctiva and cornea are normal; PERRLA;     E/N/T: EARS:  normal external auditory canals  and tympanic membranes;  grossly normal hearing; OROPHARYNX:  normal mucosa, dentition, gingiva, and posterior pharynx;     NECK: range of motion is normal; thyroid exam reveals diffuse enlargement;     RESPIRATORY: normal respiratory rate and pattern with no distress; normal breath sounds with no rales, rhonchi, wheezes or rubs;     CARDIOVASCULAR: normal rate; rhythm is regular;  no systolic murmur; no edema;     GASTROINTESTINAL: nontender; normal bowel sounds; no organomegaly;     LYMPHATIC: no enlargement of cervical or facial nodes; no supraclavicular nodes;     PSYCHIATRIC: affect/demeanor: anxious, depressed, tearful;  normal psychomotor function;         ASSESSMENT           719.47   M79.672  Joint pain, ankle and foot              DDx:     311   F32.1  Depression              DDx:         ORDERS:         Procedures Ordered:       REFER  Referral to Specialist or Other Facility  (Send-Out)         REFER  Referral to Specialist or Other Facility  (Send-Out)                   PLAN:          Joint pain, ankle and foot         REFERRALS:  Referral initiated to a podiatrist ( Dr. White ).      RECOMMENDATIONS given include: Today, we have reviewed Cindy's care.  We will make referral for her to see podiatry.  I'm more concerned about the issues in the family and her depression.  She is overwhelmed by the difficulty that is going on with her daughter and the family.  We will try to make referral for her to see psychology and go from there..            Orders:       REFER  Referral to Specialist or Other Facility  (Send-Out)            Depression         REFERRALS:  Referral initiated to a psychologist.            Orders:       REFER  Referral to Specialist or Other Facility  (Send-Out)             Patient Education Handouts:       Depression (Depressive Disorder)              CHARGE CAPTURE           **Please note: ICD descriptions below are intended for billing purposes only and may not represent clinical  diagnoses**        Primary Diagnosis:         719.47 Joint pain, ankle and foot            M79.672    Pain in left foot              Orders:          [id number]   Office/outpatient visit; established patient, level 3  (In-House)           311 Depression            F32.1    Major depressive disorder, single episode, moderate

## 2021-05-18 NOTE — PROGRESS NOTES
Cindy Deng  1960     Office/Outpatient Visit    Visit Date: Mon, Feb 8, 2021 10:33 am    Provider: Alex Coe MD (Assistant: Deana Maurice LPN)    Location: [facility]        Electronically signed by Alex Coe MD on  02/10/2021 10:22:10 AM                             Subjective:        CC: memory problems, foot pain    HPI:       Cindy is in today for follow up on her memory.  She has noted some change in her memory for the last 90 days.  She has felt some depression, but she is on treatment for this.  She is not sure if they are related.  She is currently taking bupropion for this.  She says the main way this plays out is struggling to find some words.  She is not able to find the word she is looking for.  She is seeing this impact her daily function.  She is unaware of any history of dementia in her family.  She has not had any concussions of which she is aware.          Cindy also has history of chronic pain in the L great toe joint probably related to arthritis.  She has received injections in the foot with Dr. White locally and would like referral back to him for evaluation and treatment.    ROS:     CONSTITUTIONAL:  Negative for chills and fever.      CARDIOVASCULAR:  Negative for chest pain and palpitations.      RESPIRATORY:  Negative for recent cough and dyspnea.      GASTROINTESTINAL:  Negative for abdominal pain, nausea and vomiting.      INTEGUMENTARY/BREAST:  Negative for atypical mole(s) and rash.          Past Medical History / Family History / Social History:         Last Reviewed on 2/08/2021 10:38 AM by Alex Coe    Past Medical History:         Hypertension     Depression    Anxiety         Surgical History:         5th Metatarsal; Procedures: colonoscopy 2012 - hemorrhoid - no polyps         Family History:         Positive for Colon Cancer ( father ) and Prostate Cancer ( father ).      Positive for Renal Failure (  mother ).          Social History:     Occupation: Retired (Prior occupation: [employer])     Marital Status:      Children: 1 child         Tobacco/Alcohol/Supplements:     Last Reviewed on 2/08/2021 10:38 AM by Alex Coe    Tobacco: She has a past history of cigarette smoking; quit date:  1990s.          Alcohol: Frequency:    'I drink wine';         Substance Abuse History:     Last Reviewed on 2/08/2021 10:38 AM by Alex Coe    NEGATIVE         Mental Health History:     Last Reviewed on 2/08/2021 10:38 AM by Alex Coe        Communicable Diseases (eg STDs):     Last Reviewed on 2/08/2021 10:38 AM by Alex Coe        Current Problems:     Last Reviewed on 2/08/2021 10:38 AM by Alex Coe    Major depressive disorder, single episode, moderate    Cellulitis of face    Other hemorrhoids    Other amnesia    Cough        Immunizations:     influenza, injectable, quadrivalent, preservative free 10/23/2019    Fluarix pf 1/14/2013    Fluzone Quadrivalent (3+ years) 10/23/2018        Allergies:     Last Reviewed on 2/08/2021 10:38 AM by Alex Coe    Lisinopril: cough  (Adverse Reaction)        Current Medications:     Last Reviewed on 2/08/2021 10:38 AM by Alex Coe    Wellbutrin XL 150mg Tablets, Extended Release [Take 1 tablet(s) by mouth daily]    Amlodipine  5 mg oral tablet [Take 1 tablet(s) by mouth daily]    Losartan 50 mg oral tablet    traZODone 100 mg oral tablet    Singulair 10 mg oral tablet [Take 1 tablet(s) by mouth each evening]    benzonatate 200 mg oral capsule [take 1 capsule (200 mg) by oral route 3 times per day as needed]        Objective:        Vitals:         Current: 2/8/2021 10:37:07 AM    Ht:  5 ft, 5 in;  Wt: 182.8 lbs;  BMI: 30.4T: 97.3 F (oral);  BP: 122/77 mm Hg (left arm, sitting);  P: 66 bpm (left arm (BP Cuff), sitting);  sCr: 1.04 mg/dL;  GFR: 61.33        Exams:     PHYSICAL EXAM:     GENERAL:  vital signs recorded - well developed, well nourished;  no apparent distress;     EYES: extraocular movements intact; conjunctiva and cornea are normal; PERRL;     E/N/T:  normal EACs, TMs, nasal/oral mucosa, teeth, gingiva, and oropharynx;     NECK: range of motion is normal; thyroid is non-palpable;     RESPIRATORY: normal respiratory rate and pattern with no distress; normal breath sounds with no rales, rhonchi, wheezes or rubs;     CARDIOVASCULAR: normal rate; rhythm is regular;  no systolic murmur; no edema;     GASTROINTESTINAL: nontender; normal bowel sounds; no organomegaly;     LYMPHATIC: no enlargement of cervical or facial nodes; no supraclavicular nodes;     BREAST/INTEGUMENT: SKIN: no significant rashes or lesions; no suspicious moles;     NEUROLOGIC: mental status: alert and oriented x 3; cranial nerves II-XII grossly intact;     PSYCHIATRIC: appropriate affect and demeanor; normal psychomotor function;         Assessment:         R41.3   Other amnesia       M79.672   Pain in left foot       R53.83   Other fatigue       R47.01   Aphasia           ORDERS:         Lab Orders:       77160  B12FO - [facility] Vitamin B12 with Folate  (Send-Out)            84016  BDCB2 - [facility] CBC w/o diff  (Send-Out)            47185  COMP - [facility] Comp. Metabolic Panel  (Send-Out)            57415  FERR - [facility] Ferritin Serum  (Send-Out)            95347  IRONP - [facility] Iron and TIBC  (Send-Out)            03926  RPR - [facility] RPR, Rfx Qn RPR/Confirm TP  (Send-Out)            60832  THYII - [facility] Thyroid panel with TSH (59527, 33562)  (Send-Out)            85733  LYSCR - [facility] Lyme Ab/Western Blot Reflex  (Send-Out)              Procedures Ordered:       REFER  Referral to Specialist or Other Facility  (Send-Out)                      Plan:         Other amnesia    LABORATORY:  Labs ordered to be performed today include Lyme Ab/Western Blot Reflex, RPR, and Thyroid Panel.      RECOMMENDATIONS given include: Today, we have reviewed Cindy's care.   We will do additional evaluation of her memory and speech issues as noted.  I am leaning toward MRI as a precaution.  We will see what her testing shows and then consider how to move forward..            Orders:       89971  RPR - [facility] RPR, Rfx Qn RPR/Confirm TP  (Send-Out)            62331  THYII - [facility] Thyroid panel with TSH (79965, 55557)  (Send-Out)            85644  LYSCR - [facility] Lyme Ab/Western Blot Reflex  (Send-Out)              Pain in left foot        REFERRALS:  Referral initiated to a podiatrist ( Dr. White ).            Orders:       REFER  Referral to Specialist or Other Facility  (Send-Out)              Other fatigue    LABORATORY:  Labs ordered to be performed today include Anemia profile ferritin Serum iron, B12 with Folate, CBC W/O DIFF, and Comprehensive metabolic panel.            Orders:       31782  B12FO - [facility] Vitamin B12 with Folate  (Send-Out)            78660  BDCB2 - [facility] CBC w/o diff  (Send-Out)            95896  COMP - [facility] Comp. Metabolic Panel  (Send-Out)            09148  FERR - [facility] Ferritin Serum  (Send-Out)            55016  IRONP - [facility] Iron and TIBC  (Send-Out)              AphasiaAs above.            Charge Capture:         Primary Diagnosis:     R41.3  Other amnesia           Orders:      22011  Office/outpatient visit; established patient, level 4  (In-House)              M79.672  Pain in left foot     R53.83  Other fatigue     R47.01  Aphasia

## 2021-06-24 ENCOUNTER — TELEPHONE (OUTPATIENT)
Dept: FAMILY MEDICINE CLINIC | Age: 61
End: 2021-06-24

## 2021-06-24 NOTE — TELEPHONE ENCOUNTER
Caller: Cindy Deng    Relationship to patient: Self    Best call back number: [phone]      Patient is needing: PATIENT STATES SHE IS GETTING BILLS FROM INSURANCE COMPANY BECAUSE A REFERRAL WAS NOT SEEN FOR DR WISEMAN THAT SHE SAW ON 4-5-21. PATIENT NEEDS REFERRAL SENT TO [facility] SO THAT IT WILL COVER.

## 2021-06-25 NOTE — TELEPHONE ENCOUNTER
Spoke with pt, she was not calling a referral to , she was calling regarding a referral to . Instructed referral was placed last week when I spoke to her, forwarded message to referral pool.

## 2021-06-25 NOTE — TELEPHONE ENCOUNTER
You will need to confirm the diagnosis with her.  It is okay with me to do the referral though.  Thanks.

## 2021-07-01 VITALS
SYSTOLIC BLOOD PRESSURE: 138 MMHG | TEMPERATURE: 98.5 F | BODY MASS INDEX: 29.06 KG/M2 | HEART RATE: 59 BPM | DIASTOLIC BLOOD PRESSURE: 88 MMHG | WEIGHT: 174.4 LBS | HEIGHT: 65 IN

## 2021-07-01 VITALS
TEMPERATURE: 98.1 F | HEART RATE: 77 BPM | DIASTOLIC BLOOD PRESSURE: 82 MMHG | WEIGHT: 183 LBS | HEIGHT: 65 IN | SYSTOLIC BLOOD PRESSURE: 137 MMHG | BODY MASS INDEX: 30.49 KG/M2

## 2021-07-01 VITALS
HEIGHT: 65 IN | BODY MASS INDEX: 29.82 KG/M2 | SYSTOLIC BLOOD PRESSURE: 141 MMHG | TEMPERATURE: 97.9 F | HEART RATE: 57 BPM | DIASTOLIC BLOOD PRESSURE: 84 MMHG | WEIGHT: 179 LBS

## 2021-07-01 VITALS
WEIGHT: 175.3 LBS | BODY MASS INDEX: 29.2 KG/M2 | SYSTOLIC BLOOD PRESSURE: 138 MMHG | HEIGHT: 65 IN | TEMPERATURE: 98.2 F | DIASTOLIC BLOOD PRESSURE: 89 MMHG | HEART RATE: 62 BPM

## 2021-07-02 VITALS
HEIGHT: 65 IN | BODY MASS INDEX: 30.46 KG/M2 | SYSTOLIC BLOOD PRESSURE: 122 MMHG | TEMPERATURE: 97.3 F | DIASTOLIC BLOOD PRESSURE: 77 MMHG | HEART RATE: 66 BPM | WEIGHT: 182.8 LBS

## 2021-07-02 VITALS
SYSTOLIC BLOOD PRESSURE: 139 MMHG | BODY MASS INDEX: 30.59 KG/M2 | HEIGHT: 65 IN | TEMPERATURE: 98 F | HEART RATE: 84 BPM | DIASTOLIC BLOOD PRESSURE: 88 MMHG | WEIGHT: 183.6 LBS

## 2021-07-02 VITALS
TEMPERATURE: 97.5 F | HEART RATE: 62 BPM | DIASTOLIC BLOOD PRESSURE: 81 MMHG | WEIGHT: 181.2 LBS | BODY MASS INDEX: 30.19 KG/M2 | HEIGHT: 65 IN | SYSTOLIC BLOOD PRESSURE: 129 MMHG

## 2021-07-20 ENCOUNTER — OFFICE VISIT (OUTPATIENT)
Dept: FAMILY MEDICINE CLINIC | Age: 61
End: 2021-07-20

## 2021-07-20 VITALS
WEIGHT: 187.4 LBS | HEIGHT: 65 IN | SYSTOLIC BLOOD PRESSURE: 151 MMHG | BODY MASS INDEX: 31.22 KG/M2 | DIASTOLIC BLOOD PRESSURE: 80 MMHG | HEART RATE: 43 BPM

## 2021-07-20 DIAGNOSIS — M25.511 ACUTE PAIN OF RIGHT SHOULDER: Primary | ICD-10-CM

## 2021-07-20 PROCEDURE — 99213 OFFICE O/P EST LOW 20 MIN: CPT | Performed by: FAMILY MEDICINE

## 2021-07-20 RX ORDER — HYDROCHLOROTHIAZIDE 12.5 MG/1
12.5 TABLET ORAL DAILY
COMMUNITY

## 2021-07-20 RX ORDER — METHYLPREDNISOLONE 4 MG/1
TABLET ORAL
Qty: 1 EACH | Refills: 0 | Status: SHIPPED | OUTPATIENT
Start: 2021-07-20 | End: 2022-04-15

## 2021-07-20 RX ORDER — HYDROCODONE BITARTRATE AND ACETAMINOPHEN 5; 325 MG/1; MG/1
1 TABLET ORAL EVERY 6 HOURS PRN
Qty: 12 TABLET | Refills: 0 | Status: SHIPPED | OUTPATIENT
Start: 2021-07-20 | End: 2022-04-15

## 2021-07-20 RX ORDER — BUPROPION HYDROCHLORIDE 300 MG/1
300 TABLET ORAL DAILY
COMMUNITY

## 2021-07-20 RX ORDER — LOSARTAN POTASSIUM 100 MG/1
100 TABLET ORAL DAILY
COMMUNITY

## 2021-07-20 RX ORDER — ASPIRIN 81 MG/1
81 TABLET ORAL DAILY
COMMUNITY

## 2021-07-20 RX ORDER — TRAZODONE HYDROCHLORIDE 100 MG/1
100 TABLET ORAL NIGHTLY
COMMUNITY

## 2021-07-20 NOTE — PROGRESS NOTES
Cindy Deng presents to [facility].    Chief Complaint:  'My shoulder'    Subjective       History of Present Illness:  Cindy is in today for follow-up on right shoulder pain.  She has had difficulty with pain in the right shoulder for the last 3 weeks.  She is not aware of any specific injury that might explain this.  She has not had any previous surgery on the shoulder.  She has a history of bursitis in the past.  She has been taking some ibuprofen for this as well.  She is having significant difficulty sleeping because of the pain in the shoulder.  She denies fever or chills.  She has no history of gout.      Review of Systems:  Review of Systems   Constitutional: Negative for chills and fever.   Respiratory: Negative for cough and shortness of breath.    Cardiovascular: Negative for chest pain and palpitations.   Gastrointestinal: Negative for abdominal pain, nausea and vomiting.        Objective   Medical History:  Past Medical History:   • Anxiety and depression   • HTN (hypertension)   • Major depressive disorder, single episode, moderate (CMS/HCC)   • Other hemorrhoids     Past Surgical History:   • KIA BUNIONECTOMY    5TH      Family History   Problem Relation Age of Onset   • Kidney failure Mother    • Colon cancer Father    • Prostate cancer Father      Social History     Tobacco Use   • Smoking status: Former Smoker     Packs/day: 1.00     Years: 15.00     Pack years: 15.00     Types: Cigarettes     Quit date:      Years since quittin.5   • Smokeless tobacco: Never Used   Substance Use Topics   • Alcohol use: Yes     Comment: 'I DRINK WINE'       Health Maintenance Due   Topic Date Due   • COLORECTAL CANCER SCREENING  Never done   • ANNUAL PHYSICAL  Never done   • COVID-19 Vaccine (1) Never done   • TDAP/TD VACCINES (1 - Tdap) Never done   • ZOSTER VACCINE (1 of 2) Never done   • MAMMOGRAM  2016   • HEPATITIS C SCREENING  Never done   • PAP SMEAR   "Never done        Immunization History   Administered Date(s) Administered   • Influenza Quad Vaccine (Inpatient) 01/14/2013   • Influenza, Unspecified 10/01/2020       No Known Allergies     Medications:  Current Outpatient Medications on File Prior to Visit   Medication Sig   • aspirin 81 MG EC tablet Take 81 mg by mouth Daily.   • buPROPion XL (WELLBUTRIN XL) 300 MG 24 hr tablet Take 300 mg by mouth Daily.   • hydroCHLOROthiazide (HYDRODIURIL) 12.5 MG tablet Take 12.5 mg by mouth Daily. Every other day 25mg   • losartan (Cozaar) 100 MG tablet Take 100 mg by mouth Daily.   • traZODone (DESYREL) 100 MG tablet Take 100 mg by mouth Every Night.     No current facility-administered medications on file prior to visit.       Vital Signs:   /82 (BP Location: Left arm, Patient Position: Sitting, Cuff Size: Adult)   Pulse (!) 45   Ht 165.1 cm (65\")   Wt 85 kg (187 lb 6.4 oz)   BMI 31.18 kg/m²       Physical Exam:  Physical Exam  Vitals reviewed.   Constitutional:       General: She is not in acute distress.     Appearance: She is not ill-appearing.   Eyes:      Pupils: Pupils are equal, round, and reactive to light.   Neck:      Comments: No thyromegaly  Cardiovascular:      Rate and Rhythm: Normal rate and regular rhythm.   Pulmonary:      Effort: Pulmonary effort is normal.      Breath sounds: Normal breath sounds.   Abdominal:      General: There is no distension.      Palpations: Abdomen is soft.      Tenderness: There is no abdominal tenderness.   Musculoskeletal:      Cervical back: Neck supple.      Comments: There is limited range of motion due to pain.  No obvious other acute finding is noted.   Lymphadenopathy:      Cervical: No cervical adenopathy.   Skin:     Findings: No lesion or rash.   Neurological:      Mental Status: She is alert.         Result Review      The following data was reviewed by Alex Coe MD on 07/20/2021.  Lab Results   Component Value Date    WBC 5.16 02/08/2021    " HGB 13.30 02/08/2021    HCT 41.3 02/08/2021    MCV 82.8 02/08/2021    .00 02/08/2021     Lab Results   Component Value Date    BUN 17 02/08/2021    CREATININE 1.11 (H) 02/08/2021    BCR 15 02/08/2021    K 4.0 02/08/2021    CO2 26 02/08/2021    CALCIUM 9.6 02/08/2021    ALBUMIN 4.0 02/08/2021    LABIL2 1.1 (L) 02/08/2021    AST 19 02/08/2021    ALT 27 02/08/2021     No results found for: CHOL, CHLPL, TRIG, HDL, LDL, LDLDIRECT  Lab Results   Component Value Date    TSH 0.836 02/08/2021     No results found for: HGBA1C         Assessment and Plan:   It is certainly possible that she has had some flaring of bursitis.  We will go ahead and cover her for this with a steroid pack since she is not seeing improvement from ibuprofen.  Additionally, she has not seen any benefit from a muscle relaxer that she was prescribed.  We will go ahead and send a limited quantity of hydrocodone for as needed use at night.  We discussed that this is a narcotic medication.  Risks are discussed including potential for addiction, impairment, and rare overdose.  We will obtain her consent.  Mani is clear.  I have also encouraged her to work through some range of motion exercises 2-3 times daily.  I do not want this to turn into a frozen shoulder situation.  If she does not see improvement over the course of the week, then we will move ahead with x-ray and consider additional referral or treatment.       Diagnoses and all orders for this visit:    1. Acute pain of right shoulder (Primary)  -     methylPREDNISolone (MEDROL) 4 MG dose pack; Take as directed on package instructions.  Dispense: 1 each; Refill: 0  -     HYDROcodone-acetaminophen (Norco) 5-325 MG per tablet; Take 1 tablet by mouth Every 6 (Six) Hours As Needed for Moderate Pain .  Dispense: 12 tablet; Refill: 0          Follow Up   Return if symptoms worsen or fail to improve.  Patient was given instructions and counseling regarding her condition or for health maintenance  advice. Please see specific information pulled into the AVS if appropriate.

## 2021-08-03 ENCOUNTER — TELEPHONE (OUTPATIENT)
Dept: FAMILY MEDICINE CLINIC | Age: 61
End: 2021-08-03

## 2021-08-03 DIAGNOSIS — M79.672 LEFT FOOT PAIN: Primary | ICD-10-CM

## 2021-08-03 NOTE — TELEPHONE ENCOUNTER
Caller: Cindy Deng    Relationship: Self    Best call back number: [phone]    What is the best time to reach you: ANY    Who are you requesting to speak with (clinical staff, provider,  specific staff member): CLINICAL STAFF    What was the call regarding: PATIENT CALLED STATING RECEIVED A BILL FROM HER FOOT/ANKLE SPECIALIST DUE TO A REFERRAL ERROR. SHE HAD THE REFERRAL CORRECTED BUT IT WAS NOT BACK-DATED AND SHE STILL HAS THE BILL. SHE WOULD LIKE TO SPEAK TO A NURSE ABOUT THIS.    Do you require a callback: YES

## 2021-08-09 NOTE — TELEPHONE ENCOUNTER
I could not find the order note.  Please place the referral order or forward me the correct note.  Thanks.

## 2021-08-20 ENCOUNTER — HOSPITAL ENCOUNTER (OUTPATIENT)
Dept: GENERAL RADIOLOGY | Facility: HOSPITAL | Age: 61
Discharge: HOME | End: 2021-08-20
Admitting: NURSE PRACTITIONER

## 2021-08-20 ENCOUNTER — OFFICE VISIT (OUTPATIENT)
Dept: FAMILY MEDICINE CLINIC | Age: 61
End: 2021-08-20

## 2021-08-20 VITALS
DIASTOLIC BLOOD PRESSURE: 80 MMHG | SYSTOLIC BLOOD PRESSURE: 132 MMHG | HEART RATE: 65 BPM | TEMPERATURE: 98.2 F | WEIGHT: 185.6 LBS | HEIGHT: 65 IN | BODY MASS INDEX: 30.92 KG/M2

## 2021-08-20 DIAGNOSIS — G89.29 CHRONIC RIGHT SHOULDER PAIN: Primary | ICD-10-CM

## 2021-08-20 DIAGNOSIS — M25.511 ACUTE PAIN OF RIGHT SHOULDER: ICD-10-CM

## 2021-08-20 DIAGNOSIS — M25.511 CHRONIC RIGHT SHOULDER PAIN: Primary | ICD-10-CM

## 2021-08-20 PROCEDURE — 73030 X-RAY EXAM OF SHOULDER: CPT | Performed by: RADIOLOGY

## 2021-08-20 PROCEDURE — 73030 X-RAY EXAM OF SHOULDER: CPT

## 2021-08-20 PROCEDURE — 99214 OFFICE O/P EST MOD 30 MIN: CPT | Performed by: NURSE PRACTITIONER

## 2021-08-20 NOTE — PROGRESS NOTES
Chief Complaint  Cindy Deng presents to [facility] for Pain (right shoulder )    Subjective          Shoulder Pain: Patient complaints of right shoulder pain. This is evaluated as a personal injury. The pain is described as aching and throbbing.  The onset of the pain was years ago.  The pain occurs continuously and lasts  .  Location is anterior,superior and radiates to the posterior neck. No history of dislocation. Symptoms are aggravated by reaching, carrying, ADL's, dressing self, work at or above shoulder height, difficulty sleeping on affected side, exercise. Symptoms are diminished by  avoiding the painful activities.   Limited activities include: exercise. moderate stiffness, no swelling, no crepitus noted is reported. 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  She does report that she has had off and on shoulder pain over time, was treated with steroids and hydrocodone but this did not resolve the problem.               Review of Systems   Constitutional: Negative for fatigue and fever.   Respiratory: Negative for shortness of breath.    Cardiovascular: Negative for chest pain.   Musculoskeletal: Positive for arthralgias (right shoulder ).   Neurological: Negative for weakness and numbness.   Psychiatric/Behavioral: Negative for depressed mood. The patient is not nervous/anxious.          No Known Allergies   Past Medical History:   Diagnosis Date   • Anxiety and depression    • HTN (hypertension)    • Major depressive disorder, single episode, moderate (CMS/HCC)    • Other hemorrhoids      Current Outpatient Medications   Medication Sig Dispense Refill   • aspirin 81 MG EC tablet Take 81 mg by mouth Daily.     • buPROPion XL (WELLBUTRIN XL) 300 MG 24 hr tablet Take 300 mg by mouth Daily.     • hydroCHLOROthiazide (HYDRODIURIL) 12.5 MG tablet Take 12.5 mg by mouth Daily. Every other day 25mg     • HYDROcodone-acetaminophen (Norco) 5-325 MG per  "tablet Take 1 tablet by mouth Every 6 (Six) Hours As Needed for Moderate Pain . 12 tablet 0   • losartan (Cozaar) 100 MG tablet Take 100 mg by mouth Daily.     • traZODone (DESYREL) 100 MG tablet Take 100 mg by mouth Every Night.     • methylPREDNISolone (MEDROL) 4 MG dose pack Take as directed on package instructions. 1 each 0     No current facility-administered medications for this visit.     Past Surgical History:   Procedure Laterality Date   • KIA BUNIONECTOMY      5TH      Social History     Tobacco Use   • Smoking status: Former Smoker     Packs/day: 1.00     Years: 15.00     Pack years: 15.00     Types: Cigarettes     Quit date:      Years since quittin.6   • Smokeless tobacco: Never Used   Substance Use Topics   • Alcohol use: Yes     Comment: 'I DRINK WINE'   • Drug use: Yes     Types: Marijuana     Comment: CBDs oil     Family History   Problem Relation Age of Onset   • Kidney failure Mother    • Colon cancer Father    • Prostate cancer Father      Health Maintenance Due   Topic Date Due   • COLORECTAL CANCER SCREENING  Never done   • ANNUAL PHYSICAL  Never done   • COVID-19 Vaccine (1) Never done   • TDAP/TD VACCINES (1 - Tdap) Never done   • ZOSTER VACCINE (1 of 2) Never done   • MAMMOGRAM  2016   • HEPATITIS C SCREENING  Never done   • PAP SMEAR  Never done      Immunization History   Administered Date(s) Administered   • Influenza Quad Vaccine (Inpatient) 2013   • Influenza, Unspecified 10/01/2020        Objective     Vitals:    21 1453   BP: 132/80   BP Location: Left arm   Patient Position: Sitting   Pulse: 65   Temp: 98.2 °F (36.8 °C)   Weight: 84.2 kg (185 lb 9.6 oz)   Height: 165.1 cm (65\")     Body mass index is 30.89 kg/m².     Physical Exam  Constitutional:       General: She is not in acute distress.     Appearance: Normal appearance.   HENT:      Head: Normocephalic.   Cardiovascular:      Rate and Rhythm: Normal rate and regular rhythm.   Pulmonary:      " Effort: Pulmonary effort is normal.      Breath sounds: Normal breath sounds.   Musculoskeletal:      Right shoulder: Tenderness present. Decreased range of motion (not decreased, but pain with ROM).   Neurological:      General: No focal deficit present.      Mental Status: She is alert and oriented to person, place, and time.   Psychiatric:         Mood and Affect: Mood normal.         Behavior: Behavior normal.           Result Review :                               Assessment and Plan      Diagnoses and all orders for this visit:    1. Chronic right shoulder pain (Primary)  Comments:  Will get her in with PT, recommend ortho referral if no improvement after PT - consider MRI;   She will continue pain medication as prescribed  Orders:  -     XR Shoulder 2+ View Right; Future  -     Ambulatory Referral to Physical Therapy Evaluate and treat              Follow Up     No follow-ups on file.

## 2021-08-31 ENCOUNTER — OFFICE VISIT (OUTPATIENT)
Dept: ORTHOPEDIC SURGERY | Facility: CLINIC | Age: 61
End: 2021-08-31

## 2021-08-31 VITALS — OXYGEN SATURATION: 97 % | HEART RATE: 72 BPM | BODY MASS INDEX: 31.56 KG/M2 | HEIGHT: 65 IN | WEIGHT: 189.4 LBS

## 2021-08-31 DIAGNOSIS — M75.51 BURSITIS OF RIGHT SHOULDER: Primary | ICD-10-CM

## 2021-08-31 DIAGNOSIS — M19.011 OSTEOARTHRITIS OF RIGHT SHOULDER, UNSPECIFIED OSTEOARTHRITIS TYPE: ICD-10-CM

## 2021-08-31 PROCEDURE — 99204 OFFICE O/P NEW MOD 45 MIN: CPT | Performed by: ORTHOPAEDIC SURGERY

## 2021-08-31 PROCEDURE — 20610 DRAIN/INJ JOINT/BURSA W/O US: CPT | Performed by: ORTHOPAEDIC SURGERY

## 2021-08-31 RX ORDER — DICLOFENAC SODIUM 75 MG/1
75 TABLET, DELAYED RELEASE ORAL 2 TIMES DAILY
Qty: 60 TABLET | Refills: 1 | Status: SHIPPED | OUTPATIENT
Start: 2021-08-31 | End: 2021-12-07 | Stop reason: SDUPTHER

## 2021-08-31 RX ADMIN — METHYLPREDNISOLONE ACETATE 80 MG: 80 INJECTION, SUSPENSION INTRA-ARTICULAR; INTRALESIONAL; INTRAMUSCULAR; SOFT TISSUE at 13:16

## 2021-08-31 RX ADMIN — LIDOCAINE HYDROCHLORIDE 9 ML: 10 INJECTION, SOLUTION INFILTRATION; PERINEURAL at 13:16

## 2021-09-01 RX ORDER — LIDOCAINE HYDROCHLORIDE 10 MG/ML
9 INJECTION, SOLUTION INFILTRATION; PERINEURAL
Status: COMPLETED | OUTPATIENT
Start: 2021-08-31 | End: 2021-08-31

## 2021-09-01 RX ORDER — METHYLPREDNISOLONE ACETATE 80 MG/ML
80 INJECTION, SUSPENSION INTRA-ARTICULAR; INTRALESIONAL; INTRAMUSCULAR; SOFT TISSUE
Status: COMPLETED | OUTPATIENT
Start: 2021-08-31 | End: 2021-08-31

## 2021-09-30 ENCOUNTER — OFFICE VISIT (OUTPATIENT)
Dept: ORTHOPEDIC SURGERY | Facility: CLINIC | Age: 61
End: 2021-09-30

## 2021-09-30 VITALS — HEIGHT: 64 IN | WEIGHT: 194 LBS | BODY MASS INDEX: 33.12 KG/M2 | HEART RATE: 77 BPM | OXYGEN SATURATION: 97 %

## 2021-09-30 DIAGNOSIS — M19.019 GLENOHUMERAL ARTHRITIS: Primary | ICD-10-CM

## 2021-09-30 DIAGNOSIS — M25.511 RIGHT SHOULDER PAIN, UNSPECIFIED CHRONICITY: ICD-10-CM

## 2021-09-30 PROCEDURE — 99213 OFFICE O/P EST LOW 20 MIN: CPT | Performed by: PHYSICIAN ASSISTANT

## 2021-09-30 NOTE — PATIENT INSTRUCTIONS
Patient will resume physical therapy, continue home exercises and meloxicam.  Follow-up in 6 to 8 weeks for recheck.  Patient interested in receiving another steroid injection when possible.

## 2021-09-30 NOTE — PROGRESS NOTES
"Chief Complaint  Follow-up of the Right Shoulder    Subjective          Cindy Deng presents to [facility] for follow-up on right shoulder pain.  Pain has been present since June.  She states that she was working out during that time and began noticing pain in her shoulder.  She points to the lateral aspect of the shoulder when describing her pain.  Heat and ice as well as steroid packs and hydrocodone provided little relief early on.  She began taking Mobic at her last visit and states this is helped with her pain.  She has significant pain in the evenings.  She started doing outpatient PT at a [facility] and states she had significant pain relief, unfortunately her provider contracted Covid and she has been unable to go until yesterday.  She plans to resume PT.  She states she has not been consistent with home exercises.  She received a steroid injection at her last visit which she states greatly helped with her pain.  She states she barely had any pain until a few days ago, which she attributes to inactivity since she has not been doing PT or home exercises.    Objective   Allergies   Allergen Reactions   • Lisinopril Cough       Vital Signs:   Pulse 77   Ht 162.6 cm (64\")   Wt 88 kg (194 lb)   SpO2 97%   BMI 33.30 kg/m²       Physical Exam  Constitutional:       Appearance: Normal appearance. Patient is well-developed and normal weight.   HENT:      Head: Normocephalic.      Right Ear: Hearing and external ear normal.      Left Ear: Hearing and external ear normal.      Nose: Nose normal.   Eyes:      Conjunctiva/sclera: Conjunctivae normal.   Cardiovascular:      Rate and Rhythm: Normal rate.   Pulmonary:      Effort: Pulmonary effort is normal.      Breath sounds: No wheezing or rales.   Abdominal:      Palpations: Abdomen is soft.      Tenderness: There is no abdominal tenderness.   Musculoskeletal:      Cervical back: Normal range of motion.   Skin:     Findings: No rash. "   Neurological:      Mental Status: Patient is alert and oriented to person, place, and time.   Psychiatric:         Mood and Affect: Mood and affect normal.         Judgment: Judgment normal.     Ortho Exam  Right shoulder: Tenderness to the lateral aspect of the shoulder, skin intact, no swelling, full flexion full abduction and internal rotation to T12.  Sensation light touch is intact, radial pulses 2+, positive crossover testing, good range of motion right elbow wrist and digits.  Result Review :            Imaging Results (Most Recent)     None                Assessment and Plan    Problem List Items Addressed This Visit        Musculoskeletal and Injuries    Glenohumeral arthritis - Primary    Right shoulder pain    Current Assessment & Plan     Patient will resume physical therapy, continue home exercises and meloxicam.  Follow-up in 6 to 8 weeks for recheck.  Patient interested in receiving another steroid injection when possible.               Follow Up   Return in about 8 weeks (around 11/25/2021) for Recheck.  Patient Instructions   Patient will resume physical therapy, continue home exercises and meloxicam.  Follow-up in 6 to 8 weeks for recheck.  Patient interested in receiving another steroid injection when possible.    Patient was given instructions and counseling regarding her condition or for health maintenance advice. Please see specific information pulled into the AVS if appropriate.

## 2021-12-07 DIAGNOSIS — M75.51 BURSITIS OF RIGHT SHOULDER: ICD-10-CM

## 2021-12-07 DIAGNOSIS — M19.011 OSTEOARTHRITIS OF RIGHT SHOULDER, UNSPECIFIED OSTEOARTHRITIS TYPE: ICD-10-CM

## 2021-12-07 RX ORDER — DICLOFENAC SODIUM 75 MG/1
75 TABLET, DELAYED RELEASE ORAL 2 TIMES DAILY
Qty: 60 TABLET | Refills: 2 | Status: SHIPPED | OUTPATIENT
Start: 2021-12-07 | End: 2022-04-15

## 2021-12-21 ENCOUNTER — OFFICE VISIT (OUTPATIENT)
Dept: ORTHOPEDIC SURGERY | Facility: CLINIC | Age: 61
End: 2021-12-21

## 2021-12-21 VITALS — BODY MASS INDEX: 32.44 KG/M2 | HEIGHT: 64 IN | WEIGHT: 190 LBS

## 2021-12-21 DIAGNOSIS — M25.512 BILATERAL SHOULDER PAIN, UNSPECIFIED CHRONICITY: ICD-10-CM

## 2021-12-21 DIAGNOSIS — M19.012 PRIMARY OSTEOARTHRITIS OF BOTH SHOULDERS: Primary | ICD-10-CM

## 2021-12-21 DIAGNOSIS — M25.511 BILATERAL SHOULDER PAIN, UNSPECIFIED CHRONICITY: ICD-10-CM

## 2021-12-21 DIAGNOSIS — M19.011 PRIMARY OSTEOARTHRITIS OF BOTH SHOULDERS: Primary | ICD-10-CM

## 2021-12-21 PROCEDURE — 99213 OFFICE O/P EST LOW 20 MIN: CPT | Performed by: ORTHOPAEDIC SURGERY

## 2021-12-21 PROCEDURE — 20610 DRAIN/INJ JOINT/BURSA W/O US: CPT | Performed by: ORTHOPAEDIC SURGERY

## 2021-12-21 RX ADMIN — BUPIVACAINE HYDROCHLORIDE 5 ML: 2.5 INJECTION, SOLUTION INFILTRATION; PERINEURAL at 13:22

## 2021-12-21 RX ADMIN — BUPIVACAINE HYDROCHLORIDE 5 ML: 2.5 INJECTION, SOLUTION INFILTRATION; PERINEURAL at 13:23

## 2021-12-21 RX ADMIN — TRIAMCINOLONE ACETONIDE 40 MG: 40 INJECTION, SUSPENSION INTRA-ARTICULAR; INTRAMUSCULAR at 13:23

## 2021-12-21 RX ADMIN — TRIAMCINOLONE ACETONIDE 40 MG: 40 INJECTION, SUSPENSION INTRA-ARTICULAR; INTRAMUSCULAR at 13:22

## 2021-12-21 NOTE — PROGRESS NOTES
"Chief Complaint  Initial Evaluation of the Left Shoulder and Follow-up of the Right Shoulder     Subjective      Cindy Deng presents to [facility] for an evaluation of left shoulder and follow-up of right shoulder. Patient has osteoarthritis of the right shoulder that she has been treating conservatively thus far. She has been having right shoulder pain for the last several months. She states her left shoulder began to bother her now. She denies any injury or trauma to bilateral shoulders. She has done physical therapy for her shoulder and states it went well but didn't give her much relief.     Allergies   Allergen Reactions   • Lisinopril Cough        Social History     Socioeconomic History   • Marital status:    • Number of children: 1   Tobacco Use   • Smoking status: Former Smoker     Packs/day: 1.00     Years: 15.00     Pack years: 15.00     Types: Cigarettes     Quit date:      Years since quittin.9   • Smokeless tobacco: Never Used   Vaping Use   • Vaping Use: Never used   Substance and Sexual Activity   • Alcohol use: Yes     Comment: 'I DRINK WINE'   • Drug use: Yes     Types: Marijuana     Comment: CBDs oil        Review of Systems     Objective   Vital Signs:   Ht 162.6 cm (64\")   Wt 86.2 kg (190 lb)   BMI 32.61 kg/m²       Physical Exam  Constitutional:       Appearance: Normal appearance. Patient is well-developed and normal weight.   HENT:      Head: Normocephalic.      Right Ear: Hearing and external ear normal.      Left Ear: Hearing and external ear normal.      Nose: Nose normal.   Eyes:      Conjunctiva/sclera: Conjunctivae normal.   Cardiovascular:      Rate and Rhythm: Normal rate.   Pulmonary:      Effort: Pulmonary effort is normal.      Breath sounds: No wheezing or rales.   Abdominal:      Palpations: Abdomen is soft.      Tenderness: There is no abdominal tenderness.   Musculoskeletal:      Cervical back: Normal range of motion. "   Skin:     Findings: No rash.   Neurological:      Mental Status: Patient is alert and oriented to person, place, and time.   Psychiatric:         Mood and Affect: Mood and affect normal.         Judgment: Judgment normal.       Ortho Exam      LEFT SHOULDER: IR to L5. Good tone of deltoid, biceps, triceps, wrist extensors, and wrist flexors.  Sensation grossly intact. Neurovascular intact.  Full forward elevation. Abduction to 90 degrees. Skin intact. Tender subacromial bursa. No swelling, skin discoloration or atrophy.     RIGHT SHOULDER: IR to mid lumbar spine. Good tone of deltoid, biceps, triceps, wrist extensors, and wrist flexors.  Sensation grossly intact. Neurovascular intact.  Radial pulse 2+, ulnar pulse 2+. Full forward elevation. Abduction to 100 degrees. No swelling, skin discoloration or atrophy. Skin intact.       Large Joint Arthrocentesis: R subacromial bursa  Date/Time: 12/21/2021 1:22 PM  Consent given by: patient  Site marked: site marked  Timeout: Immediately prior to procedure a time out was called to verify the correct patient, procedure, equipment, support staff and site/side marked as required   Supporting Documentation  Indications: pain   Procedure Details  Location: shoulder - R subacromial bursa  Preparation: Patient was prepped and draped in the usual sterile fashion  Needle gauge: 21g.  Medications administered: 5 mL bupivacaine 0.25 %; 40 mg triamcinolone acetonide 40 MG/ML  Patient tolerance: patient tolerated the procedure well with no immediate complications    Large Joint Arthrocentesis: L subacromial bursa  Date/Time: 12/21/2021 1:23 PM  Consent given by: patient  Site marked: site marked  Timeout: Immediately prior to procedure a time out was called to verify the correct patient, procedure, equipment, support staff and site/side marked as required   Supporting Documentation  Indications: pain   Procedure Details  Location: shoulder - L subacromial bursa  Preparation: Patient  was prepped and draped in the usual sterile fashion  Needle gauge: 21g.  Medications administered: 5 mL bupivacaine 0.25 %; 40 mg triamcinolone acetonide 40 MG/ML  Patient tolerance: patient tolerated the procedure well with no immediate complications            Imaging Results (Most Recent)     Procedure Component Value Units Date/Time    XR Scapula Bilateral [[id number]] Resulted: 12/21/21 1325     Updated: 12/21/21 1325    Narrative:      X-Ray Report:  Bilateral shoulder(s) X-Ray  Indication: Evaluation of bilateral shoulder pain  AP and Lateral view(s)  Findings: No acute fracture or dislocation. Glenohumeral degenerative   changes of bilateral shoulders.   Prior studies available for comparison: no           Result Review :     X-Ray Report:  Bilateral shoulder(s) X-Ray  Indication: Evaluation of bilateral shoulder pain  AP and Lateral view(s)  Findings: No acute fracture or dislocation. Glenohumeral degenerative changes of bilateral shoulders.   Prior studies available for comparison: no     Assessment and Plan     DX: Bilateral shoulder osteoarthritis    Discussed injection and anti-inflammatory. Patient wishes to proceed with bilateral shoulder injections. Bilateral shoulder injections given, she tolerated this well.     Call or return if worsening symptoms.    Follow Up     PRN.       Patient was given instructions and counseling regarding her condition or for health maintenance advice. Please see specific information pulled into the AVS if appropriate.     Scribed for Gabrielle Ramirez MD by Carla Muse.  12/21/21   13:14 EST        I have personally performed the services described in this document as scribed by the above individual and it is both accurate and complete. Gabrielle Ramirez MD 12/22/21

## 2021-12-22 RX ORDER — BUPIVACAINE HYDROCHLORIDE 2.5 MG/ML
5 INJECTION, SOLUTION INFILTRATION; PERINEURAL
Status: COMPLETED | OUTPATIENT
Start: 2021-12-21 | End: 2021-12-21

## 2021-12-22 RX ORDER — TRIAMCINOLONE ACETONIDE 40 MG/ML
40 INJECTION, SUSPENSION INTRA-ARTICULAR; INTRAMUSCULAR
Status: COMPLETED | OUTPATIENT
Start: 2021-12-21 | End: 2021-12-21

## 2022-01-04 ENCOUNTER — TREATMENT (OUTPATIENT)
Dept: PHYSICAL THERAPY | Facility: CLINIC | Age: 62
End: 2022-01-04

## 2022-01-04 DIAGNOSIS — M25.511 CHRONIC RIGHT SHOULDER PAIN: Primary | ICD-10-CM

## 2022-01-04 DIAGNOSIS — G89.29 CHRONIC RIGHT SHOULDER PAIN: Primary | ICD-10-CM

## 2022-01-04 DIAGNOSIS — M75.42 IMPINGEMENT SYNDROME OF BOTH SHOULDERS: ICD-10-CM

## 2022-01-04 DIAGNOSIS — R29.898 SHOULDER WEAKNESS: ICD-10-CM

## 2022-01-04 DIAGNOSIS — M75.41 IMPINGEMENT SYNDROME OF BOTH SHOULDERS: ICD-10-CM

## 2022-01-04 PROCEDURE — 97161 PT EVAL LOW COMPLEX 20 MIN: CPT | Performed by: PHYSICAL THERAPIST

## 2022-01-04 NOTE — PROGRESS NOTES
"  Physical Therapy Initial Evaluation and Plan of Care      Patient: Cindy Deng   : 1960  Diagnosis/ICD-10 Code:  Chronic right shoulder pain [M25.511, G89.29]  Referring practitioner: MARGAUX Valenzuela  Date of Initial Visit: 2022  Today's Date: 2022  Patient seen for 1 sessions         Visit Diagnoses:    ICD-10-CM ICD-9-CM   1. Chronic right shoulder pain  M25.511 719.41    G89.29 338.29   2. Shoulder weakness  R29.898 719.61   3. Impingement syndrome of both shoulders  M75.41 726.2    M75.42        Subjective Questionnaire: QuickDASH: 27.27      Subjective Evaluation    History of Present Illness  Mechanism of injury: Pt presents to [facility] to be evaluated for shoulder pain.    Pt relates past medical  history  and current concerns.    Pt relates began to experience R shoulder pain in 2021, while working out. States did some physical therapy, but had fair results.  Relates began to have L shoulder pain in Nov,unsure why.     Pt underwent B subacromial injections 21.    Pt presents relating the shoulder pain is much less, relief > in R than L.     Pt described a \"deep ache\" which is worse with UE elevation. Denies use of ice/ heat, motrin initially, not recently.       Patient Occupation: retired from [employer] Quality of life: good    Pain  Location: R shoulder 210, L 3/10  Quality: dull ache (aching, \"pain to bone\" )  Relieving factors: change in position and rest  Aggravating factors: overhead activity (overuse)  Progression: worsening (L worse)    Social Support  Lives in: multiple-level home  Lives with: spouse    Hand dominance: left    Treatments  Previous treatment: physical therapy  Patient Goals  Patient goals for therapy: decreased pain, increased motion, increased strength and return to sport/leisure activities  Patient goal: sleep without interuption due to pain           Objective           General Comments "     Shoulder Comments   TTP over L lateral acromion, subacromial tissue with the shoulder in extension  TTP over R subacromial tissue with shoulder passively placed in extension, mild tendernes to medial ant capsular region      ROM:   Flexion : L 130, then pain ful as continues to elevate; R 135, pain as continue to elevate  Ext B 60   scaption and abduction WFL, no c/o pain   IR B WFL, no c/o  ER:L to ear with c/o, R to back of head    MMT:   Isometric testing with arm at side , elbow at 90:   L : 4/5, no c/o except IR/ER 4- with mild c/o   R 4/5    Long lever testing: arm aprox 30 degrees flexed forward, testing scapular stability:   Poor on L with c/o subacromial pain, scapula instability  Fair+ on R, mild c/o pain                Assessment & Plan     Assessment  Impairments: abnormal or restricted ROM, activity intolerance, impaired physical strength, lacks appropriate home exercise program and pain with function  Functional Limitations: uncomfortable because of pain, moving in bed, reaching behind back, reaching overhead and unable to perform repetitive tasks  Assessment details: Pt presents with decreased functional capacity due to c/o shoulder pain.    Pt relates/ demonstrates:   -TTP to  subacromial space,   -pain to elevate arm into flexion / ER  -shoulder girdle weakness,   -poor scapular stabilization with elevation of the UE, and forces applied.  -limitation with lifting , pushing, pulling, due to shoulder pain/ weakness/pain,  -limitation performing hobbies/work/ community activities, due to shoulder pain,   -interference with sleep,   -compensation necessary to perform ADLs,   -a lack of  appropriate progressive HEP.     Symptoms consistent with subacromial impingement, poor scapular stabilization.     Pt would benefit from skilled physical  therapy intervention to address the above mentioned deficits.     Time was spent discussing anatomy, physiology, pathology, and  therapy treatment plan..  Discussed posture and motions to avoid.      Pt related understanding of pathology and proposed therapy treatment plan.       Pt was given an exercise to start program.  Pt related understanding of precautions, progression parameters. Pt's questions and concerns were addressed throughout the session  As they arose.          Barriers to therapy: none noted at this time  Prognosis: good    Goals  Plan Goals: STG: 3 weeks  Pt will demonstrate/ report:     -decreased c/o pain  shoulder 2-3/10, 50-75% of the time.     -increased  shoulder AROM to 150 degrees flexion, min/no c/o pain.     -increased  shoulder AROM to 75% ER , min/no c/o pain.    -resumption of dressing / bathing with only moderate compensation due to  shoulder limitations.     -sleeping 75%, or more , of the night without waking due to  shoulder pain.     -I in HEP each visit.     -improve score on QUICK DASH.       LT weeks  Pt will report/ demonstrate:     -functional use of  shoulder to perform ADLs, hobbies , work duties with min/ no limitation due to shoulder pain.    - increase shoulder strength 4-4+/5 to perform home tasks and community activities as needed, with min/no limitation due to shoulder weakness.     -increased  scapular stabilization with functional use of  UE.    -resumption of hobbies with min limitations due to  shoulder pain/weakness.      -improve score on QUICK DASH     -be I in final HEP and progression parameters to perform after formal physical therapy has been discontinued    Plan  Therapy options: will be seen for skilled therapy services  Planned modality interventions: dry needling  Other planned modality interventions: any  other modality as indicated to benefit the pt  Planned therapy interventions: flexibility, functional ROM exercises, home exercise program, manual therapy, abdominal trunk stabilization, postural training, soft tissue mobilization, strengthening, stretching, therapeutic activities and neuromuscular  re-education  Other planned therapy interventions: any other intervention deemed necessary to benefit the pt  Frequency: 2x week  Duration in visits: 12  Duration in weeks: 6  Treatment plan discussed with: patient and PTA  Plan details: Continue therapy involving education, stabilization strengthening of the shoulder girdle, trunk stabilization training, modalities as indicated, manual therapy techniques as indicated, progressive HEP instruction.    Next visit:   -reassess tolerance to current exercises and modify and/or progress as indicated.   -initiate isometric stabilization training in pain free range   -progress HEP, given WHEP since pt may be out of town for extended periods of time         Planned interventions:  Any other modality and/or intervention deemed necessary to benefit the patient.        History # of Personal Factors and/or Comorbidities: MODERATE (1-2)  Examination of Body System(s): # of elements: HIGH (4+)  Clinical Presentation: STABLE   Clinical Decision Making: LOW       Timed:  Manual Therapy:         mins  57768;  Therapeutic Exercise:    10     mins  66712;     Neuromuscular Ruben:        mins  24215;    Therapeutic Activity:          mins  58922;     Gait Training:           mins  14478;     Ultrasound:          mins  65501;    Canalith Repos           ___  mins  33345      Untimed:  Electrical Stimulation:         mins  10539 ( );  Mechanical Traction:         mins  41572;     Low Complexity Evaluation:                      34     mins  67958;  Moderate Complexity Evaluation:                  mins  21081;   High Complexity Evaluation:                          mins  24612       Timed Treatment:   10   mins   Total Treatment:     44   mins      DATE TREATMENT INITIATED: 1/4/2022              PT SIGNATURE: Rhina Forde PT   [place] License: [id number]  This document has been electronically signed by Rhina Forde PT on January 4, 2022 16:27 EST        Initial  Certification    Certification Period: 1/4/2022 thru 4/3/2022  I certify that the therapy services are furnished while this patient is under my care.  The services outlined above are required by this patient, and will be reviewed every 90 days.     PHYSICIAN: Nina Simon APRN       PHYSICIAN PRINT NAME: ______________________________________________      PHYSICIAN SIGNATURE: ______________________________________________         DATE:________________________________

## 2022-02-17 ENCOUNTER — DOCUMENTATION (OUTPATIENT)
Dept: PHYSICAL THERAPY | Facility: CLINIC | Age: 62
End: 2022-02-17

## 2022-02-17 DIAGNOSIS — G89.29 CHRONIC RIGHT SHOULDER PAIN: Primary | ICD-10-CM

## 2022-02-17 DIAGNOSIS — M75.41 IMPINGEMENT SYNDROME OF BOTH SHOULDERS: ICD-10-CM

## 2022-02-17 DIAGNOSIS — M25.511 CHRONIC RIGHT SHOULDER PAIN: Primary | ICD-10-CM

## 2022-02-17 DIAGNOSIS — M75.42 IMPINGEMENT SYNDROME OF BOTH SHOULDERS: ICD-10-CM

## 2022-02-17 DIAGNOSIS — R29.898 SHOULDER WEAKNESS: ICD-10-CM

## 2022-02-17 NOTE — PROGRESS NOTES
Discharge Summary from Physical Therapy        Patient Information  Cindy Deng  1960  Diagnosis/ICD - 10 Code:  Chronic right shoulder pain [M25.511, G89.29]  Referring practitioner: No ref. provider found  Date of initial visit: 2/17/2022  Today's date: 2/17/2022  Patient was seen for Visit count could not be calculated. Make sure you are using a visit which is associated with an episode. sessions      Visit Diagnoses:    ICD-10-CM ICD-9-CM   1. Chronic right shoulder pain  M25.511 719.41    G89.29 338.29   2. Shoulder weakness  R29.898 719.61   3. Impingement syndrome of both shoulders  M75.41 726.2    M75.42          Discharge Status of Patient: See PT Note dated 1/4/22    Goals: Not Met    Discharge Plan: Continue with current home exercise program as instructed    Comments: Pt did not return after initial evaluation.                  PT SIGNATURE: Rhina Forde PT MS,PT  [place] License: [id number]    This document has been electronically signed by Rhina Forde PT on February 17, 2022 14:36 EST

## 2022-04-15 ENCOUNTER — LAB (OUTPATIENT)
Dept: LAB | Facility: HOSPITAL | Age: 62
End: 2022-04-15

## 2022-04-15 ENCOUNTER — OFFICE VISIT (OUTPATIENT)
Dept: FAMILY MEDICINE CLINIC | Age: 62
End: 2022-04-15

## 2022-04-15 VITALS
SYSTOLIC BLOOD PRESSURE: 140 MMHG | WEIGHT: 184.4 LBS | TEMPERATURE: 98.4 F | BODY MASS INDEX: 31.48 KG/M2 | HEIGHT: 64 IN | DIASTOLIC BLOOD PRESSURE: 90 MMHG | HEART RATE: 62 BPM

## 2022-04-15 DIAGNOSIS — G89.29 CHRONIC RIGHT SHOULDER PAIN: Primary | ICD-10-CM

## 2022-04-15 DIAGNOSIS — Z79.899 OTHER LONG TERM (CURRENT) DRUG THERAPY: ICD-10-CM

## 2022-04-15 DIAGNOSIS — M25.511 CHRONIC RIGHT SHOULDER PAIN: ICD-10-CM

## 2022-04-15 DIAGNOSIS — I10 ESSENTIAL HYPERTENSION: ICD-10-CM

## 2022-04-15 DIAGNOSIS — M25.511 CHRONIC RIGHT SHOULDER PAIN: Primary | ICD-10-CM

## 2022-04-15 DIAGNOSIS — G89.29 CHRONIC RIGHT SHOULDER PAIN: ICD-10-CM

## 2022-04-15 LAB
BASOPHILS # BLD AUTO: 0.04 10*3/MM3 (ref 0–0.2)
BASOPHILS NFR BLD AUTO: 0.9 % (ref 0–1.5)
DEPRECATED RDW RBC AUTO: 45 FL (ref 37–54)
EOSINOPHIL # BLD AUTO: 0.08 10*3/MM3 (ref 0–0.4)
EOSINOPHIL NFR BLD AUTO: 1.7 % (ref 0.3–6.2)
ERYTHROCYTE [DISTWIDTH] IN BLOOD BY AUTOMATED COUNT: 14 % (ref 12.3–15.4)
ERYTHROCYTE [SEDIMENTATION RATE] IN BLOOD: 23 MM/HR (ref 0–30)
HCT VFR BLD AUTO: 44 % (ref 34–46.6)
HGB BLD-MCNC: 13.4 G/DL (ref 12–15.9)
IMM GRANULOCYTES # BLD AUTO: 0 10*3/MM3 (ref 0–0.05)
IMM GRANULOCYTES NFR BLD AUTO: 0 % (ref 0–0.5)
LYMPHOCYTES # BLD AUTO: 1.41 10*3/MM3 (ref 0.7–3.1)
LYMPHOCYTES NFR BLD AUTO: 30.6 % (ref 19.6–45.3)
MCH RBC QN AUTO: 26.1 PG (ref 26.6–33)
MCHC RBC AUTO-ENTMCNC: 30.5 G/DL (ref 31.5–35.7)
MCV RBC AUTO: 85.8 FL (ref 79–97)
MONOCYTES # BLD AUTO: 0.57 10*3/MM3 (ref 0.1–0.9)
MONOCYTES NFR BLD AUTO: 12.4 % (ref 5–12)
NEUTROPHILS NFR BLD AUTO: 2.51 10*3/MM3 (ref 1.7–7)
NEUTROPHILS NFR BLD AUTO: 54.4 % (ref 42.7–76)
PLATELET # BLD AUTO: 271 10*3/MM3 (ref 140–450)
PMV BLD AUTO: 10 FL (ref 6–12)
RBC # BLD AUTO: 5.13 10*6/MM3 (ref 3.77–5.28)
WBC NRBC COR # BLD: 4.61 10*3/MM3 (ref 3.4–10.8)

## 2022-04-15 PROCEDURE — 86235 NUCLEAR ANTIGEN ANTIBODY: CPT

## 2022-04-15 PROCEDURE — 85652 RBC SED RATE AUTOMATED: CPT

## 2022-04-15 PROCEDURE — 86038 ANTINUCLEAR ANTIBODIES: CPT

## 2022-04-15 PROCEDURE — 80053 COMPREHEN METABOLIC PANEL: CPT

## 2022-04-15 PROCEDURE — 86200 CCP ANTIBODY: CPT

## 2022-04-15 PROCEDURE — 86063 ANTISTREPTOLYSIN O SCREEN: CPT

## 2022-04-15 PROCEDURE — 86431 RHEUMATOID FACTOR QUANT: CPT

## 2022-04-15 PROCEDURE — 84550 ASSAY OF BLOOD/URIC ACID: CPT

## 2022-04-15 PROCEDURE — 99214 OFFICE O/P EST MOD 30 MIN: CPT | Performed by: FAMILY MEDICINE

## 2022-04-15 PROCEDURE — 86140 C-REACTIVE PROTEIN: CPT

## 2022-04-15 PROCEDURE — 36415 COLL VENOUS BLD VENIPUNCTURE: CPT

## 2022-04-15 PROCEDURE — 86225 DNA ANTIBODY NATIVE: CPT

## 2022-04-15 PROCEDURE — 85025 COMPLETE CBC W/AUTO DIFF WBC: CPT

## 2022-04-15 RX ORDER — DICLOFENAC SODIUM 75 MG/1
75 TABLET, DELAYED RELEASE ORAL 2 TIMES DAILY
Qty: 60 TABLET | Refills: 2 | Status: SHIPPED | OUTPATIENT
Start: 2022-04-15 | End: 2023-02-17

## 2022-04-15 RX ORDER — HYDROCODONE BITARTRATE AND ACETAMINOPHEN 5; 325 MG/1; MG/1
1 TABLET ORAL EVERY 6 HOURS PRN
Qty: 12 TABLET | Refills: 0 | Status: SHIPPED | OUTPATIENT
Start: 2022-04-15 | End: 2023-02-17

## 2022-04-15 NOTE — PROGRESS NOTES
Cindy Deng presents to [facility].    Chief Complaint:  Follow up on shoulder pain    Subjective   {Problem List  Visit Diagnosis   Encounters  Notes  Medications  Labs  Result Review Imaging  Media :23}     History of Present Illness:  Cindy is in today for follow-up on right shoulder pain.  This is something that has bothered her for the last 8 months.  She did have x-rays in August of last year and then was subsequently seen by Dr. Ramirez in [place].  She did have an injection in the shoulder.  She also briefly did physical therapy on the shoulder for several months, but it did not improve her function.  She has been out of state for the last 4 months.  Her shoulder is giving her a lot of pain.  It is pain that runs from the shoulder down to the right elbow.  She is able to move the shoulder, but she does guard it somewhat.  She is currently taking Advil for pain.  She is really not getting good relief.      Review of Systems:  Review of Systems   Constitutional: Negative for chills and fever.   Respiratory: Negative for cough and shortness of breath.    Cardiovascular: Negative for chest pain and palpitations.   Gastrointestinal: Negative for abdominal pain, nausea and vomiting.        Objective   Medical History:  Past Medical History:   • Anxiety and depression   • Cataract   • HTN (hypertension)   • Major depressive disorder, single episode, moderate (HCC)   • Other hemorrhoids     Past Surgical History:   • KIA BUNIONECTOMY    5TH      Family History   Problem Relation Age of Onset   • Kidney failure Mother    • Colon cancer Father    • Prostate cancer Father      Social History     Tobacco Use   • Smoking status: Former Smoker     Packs/day: 1.00     Years: 15.00     Pack years: 15.00     Types: Cigarettes     Quit date:      Years since quittin.3   • Smokeless tobacco: Never Used   Substance Use Topics   • Alcohol use: Yes     Comment: 'I  "DRINK WINE'       Health Maintenance Due   Topic Date Due   • COLORECTAL CANCER SCREENING  Never done   • ANNUAL PHYSICAL  Never done   • TDAP/TD VACCINES (1 - Tdap) Never done   • ZOSTER VACCINE (1 of 2) Never done   • MAMMOGRAM  03/12/2016   • HEPATITIS C SCREENING  Never done   • PAP SMEAR  Never done        Immunization History   Administered Date(s) Administered   • COVID-19 (MODERNA) 1st, 2nd, 3rd Dose Only 03/22/2021, 04/19/2021   • COVID-19 (MODERNA) BOOSTER 12/03/2021   • Influenza Quad Vaccine (Inpatient) 01/14/2013   • Influenza, Unspecified 10/01/2020       Allergies   Allergen Reactions   • Lisinopril Cough        Medications:  Current Outpatient Medications on File Prior to Visit   Medication Sig   • aspirin 81 MG EC tablet Take 81 mg by mouth Daily.   • buPROPion XL (WELLBUTRIN XL) 300 MG 24 hr tablet Take 300 mg by mouth Daily.   • hydroCHLOROthiazide (HYDRODIURIL) 12.5 MG tablet Take 12.5 mg by mouth Daily. Every other day 25mg   • losartan (COZAAR) 100 MG tablet Take 100 mg by mouth Daily.   • traZODone (DESYREL) 100 MG tablet Take 100 mg by mouth Every Night.   • [DISCONTINUED] diclofenac (VOLTAREN) 75 MG EC tablet Take 1 tablet by mouth 2 (Two) Times a Day.   • [DISCONTINUED] HYDROcodone-acetaminophen (Norco) 5-325 MG per tablet Take 1 tablet by mouth Every 6 (Six) Hours As Needed for Moderate Pain .   • [DISCONTINUED] methylPREDNISolone (MEDROL) 4 MG dose pack Take as directed on package instructions.     No current facility-administered medications on file prior to visit.       Vital Signs:   /90 (BP Location: Left arm, Patient Position: Sitting)   Pulse 62   Temp 98.4 °F (36.9 °C) (Oral)   Ht 162.6 cm (64.02\")   Wt 83.6 kg (184 lb 6.4 oz)   BMI 31.64 kg/m²       Physical Exam:  Physical Exam  Vitals reviewed.   Constitutional:       General: She is not in acute distress.     Appearance: She is not ill-appearing.   Eyes:      Pupils: Pupils are equal, round, and reactive to light. "   Neck:      Comments: No thyromegaly  Cardiovascular:      Rate and Rhythm: Normal rate and regular rhythm.   Pulmonary:      Effort: Pulmonary effort is normal.      Breath sounds: Normal breath sounds.   Abdominal:      General: There is no distension.      Palpations: Abdomen is soft.      Tenderness: There is no abdominal tenderness.   Musculoskeletal:      Cervical back: Neck supple.      Comments: There is markedly diminished range of motion of the right shoulder due to pain.  She is able to move the arm about the elbow without significant discomfort.   Lymphadenopathy:      Cervical: No cervical adenopathy.   Skin:     Findings: No lesion or rash.   Neurological:      General: No focal deficit present.      Mental Status: She is alert.      Cranial Nerves: No cranial nerve deficit.      Motor: No weakness.      Deep Tendon Reflexes: Reflexes normal.         Result Review      The following data was reviewed by Alex Coe MD on 04/15/2022.  Lab Results   Component Value Date    WBC 5.16 02/08/2021    HGB 13.30 02/08/2021    HCT 41.3 02/08/2021    MCV 82.8 02/08/2021    .00 02/08/2021     Lab Results   Component Value Date    GLUCOSE 94 02/08/2021    BUN 17 02/08/2021    CREATININE 1.11 (H) 02/08/2021     02/08/2021    K 4.0 02/08/2021     02/08/2021    CO2 26 02/08/2021    CALCIUM 9.6 02/08/2021    PROTEINTOT 7.7 02/08/2021    ALBUMIN 4.0 02/08/2021    ALT 27 02/08/2021    AST 19 02/08/2021    ALKPHOS 75 02/08/2021    BILITOT 0.18 (L) 02/08/2021    GLOB 3.7 (H) 02/08/2021    BCR 15 02/08/2021    ANIONGAP 13 02/08/2021      No results found for: CHOL, CHLPL, TRIG, HDL, LDL, LDLDIRECT  Lab Results   Component Value Date    TSH 0.836 02/08/2021     No results found for: HGBA1C           Assessment and Plan:   Cindy has been struggling for months with the shoulder.  She has not responded to oral medication or physical therapy.  Her pain level is significantly worse recently.  I  am concerned that she may have some type of injury to the rotator cuff for possible adhesive capsulitis.  We will refer her for MRI as noted below to be followed by orthopedic appointment.  I am going to do some blood work at this time to rule out obvious autoimmune or inflammatory process.  She does have some family history of lupus.  We will cover her for pain as noted below.  She has previously taken diclofenac and did okay with it.  I have asked her to be careful and not take it with ibuprofen.  We will also give a limited supply of hydrocodone as noted below.  She has previously taken this.  She denies any addiction issues, but she is in significant pain presently.       Diagnoses and all orders for this visit:    1. Chronic right shoulder pain (Primary)  -     MRI Shoulder Right Without Contrast; Future  -     Ambulatory Referral to Orthopedic Surgery  -     Sedimentation Rate; Future  -     Cyclic Citrul Peptide Antibody, IgG / IgA; Future  -     Uric Acid; Future  -     Antistreptolysin O Screen; Future  -     Rheumatoid Factor; Future  -     C-reactive Protein; Future  -     KELLEY; Future  -     CBC Auto Differential; Future  -     diclofenac (VOLTAREN) 75 MG EC tablet; Take 1 tablet by mouth 2 (Two) Times a Day. Take with food.  Dispense: 60 tablet; Refill: 2  -     HYDROcodone-acetaminophen (Norco) 5-325 MG per tablet; Take 1 tablet by mouth Every 6 (Six) Hours As Needed for Moderate Pain .  Dispense: 12 tablet; Refill: 0    2. Essential hypertension  -     Comprehensive Metabolic Panel; Future    3. Other long term (current) drug therapy  -     CBC Auto Differential; Future          Follow Up   Return if symptoms worsen or fail to improve.  Patient was given instructions and counseling regarding her condition or for health maintenance advice. Please see specific information pulled into the AVS if appropriate.

## 2022-04-16 LAB
ALBUMIN SERPL-MCNC: 4.3 G/DL (ref 3.5–5.2)
ALBUMIN/GLOB SERPL: 1.8 G/DL
ALP SERPL-CCNC: 77 U/L (ref 39–117)
ALT SERPL W P-5'-P-CCNC: 32 U/L (ref 1–33)
ANION GAP SERPL CALCULATED.3IONS-SCNC: 9 MMOL/L (ref 5–15)
ASO AB SERPL-ACNC: NEGATIVE [IU]/ML
AST SERPL-CCNC: 18 U/L (ref 1–32)
BILIRUB SERPL-MCNC: 0.2 MG/DL (ref 0–1.2)
BUN SERPL-MCNC: 16 MG/DL (ref 8–23)
BUN/CREAT SERPL: 14.8 (ref 7–25)
CALCIUM SPEC-SCNC: 9.4 MG/DL (ref 8.6–10.5)
CHLORIDE SERPL-SCNC: 106 MMOL/L (ref 98–107)
CHROMATIN AB SERPL-ACNC: <10 IU/ML (ref 0–14)
CO2 SERPL-SCNC: 26 MMOL/L (ref 22–29)
CREAT SERPL-MCNC: 1.08 MG/DL (ref 0.57–1)
CRP SERPL-MCNC: 0.46 MG/DL (ref 0–0.5)
EGFRCR SERPLBLD CKD-EPI 2021: 57.8 ML/MIN/1.73
GLOBULIN UR ELPH-MCNC: 2.4 GM/DL
GLUCOSE SERPL-MCNC: 89 MG/DL (ref 65–99)
POTASSIUM SERPL-SCNC: 4.2 MMOL/L (ref 3.5–5.2)
PROT SERPL-MCNC: 6.7 G/DL (ref 6–8.5)
SODIUM SERPL-SCNC: 141 MMOL/L (ref 136–145)
URATE SERPL-MCNC: 4 MG/DL (ref 2.4–5.7)

## 2022-04-18 LAB
DSDNA IGG SERPL IA-ACNC: NEGATIVE [IU]/ML
NUCLEAR IGG SER IA-RTO: POSITIVE

## 2022-04-20 ENCOUNTER — TELEPHONE (OUTPATIENT)
Dept: FAMILY MEDICINE CLINIC | Age: 62
End: 2022-04-20

## 2022-04-20 DIAGNOSIS — R76.8 POSITIVE ANA (ANTINUCLEAR ANTIBODY): ICD-10-CM

## 2022-04-20 DIAGNOSIS — M25.511 CHRONIC RIGHT SHOULDER PAIN: Primary | ICD-10-CM

## 2022-04-20 DIAGNOSIS — G89.29 CHRONIC RIGHT SHOULDER PAIN: Primary | ICD-10-CM

## 2022-04-20 DIAGNOSIS — M19.019 GLENOHUMERAL ARTHRITIS: ICD-10-CM

## 2022-04-20 LAB
CCP IGA+IGG SERPL IA-ACNC: 8 UNITS (ref 0–19)
CENTROMERE B AB SER-ACNC: <0.4 IU/ML
ENA JO1 AB SER IA-ACNC: <0.3 IU/ML
ENA SCL70 IGG SER IA-ACNC: 0.8 IU/ML
ENA SM IGG SER IA-ACNC: 3 IU/ML
ENA SS-A AB SER IA-ACNC: 0.4 IU/ML
ENA SS-B IGG SER IA-ACNC: 0.4 IU/ML
RNP: 0.8 IU/ML
U1 SNRNP IGG SER IA-ACNC: 63 IU/ML

## 2022-04-20 NOTE — TELEPHONE ENCOUNTER
----- Message from Alex Coe MD sent at 4/20/2022  6:42 AM EDT -----  Please reach out to Cindy and confirm that she received this message.  See if she has additional short-term questions.  I am not advising any change in her care other than making a referral to rheumatology.  See if she has a preference in this regard.  If she does not, then place referral to Dr. Abraham in [place] for evaluation.  Thanks.

## 2022-05-19 ENCOUNTER — HOSPITAL ENCOUNTER (OUTPATIENT)
Dept: MRI IMAGING | Facility: HOSPITAL | Age: 62
Discharge: HOME | End: 2022-05-19
Admitting: FAMILY MEDICINE

## 2022-05-19 DIAGNOSIS — G89.29 CHRONIC RIGHT SHOULDER PAIN: ICD-10-CM

## 2022-05-19 DIAGNOSIS — M25.511 CHRONIC RIGHT SHOULDER PAIN: ICD-10-CM

## 2022-05-19 PROCEDURE — 73221 MRI JOINT UPR EXTREM W/O DYE: CPT

## 2022-07-01 ENCOUNTER — TELEPHONE (OUTPATIENT)
Dept: FAMILY MEDICINE CLINIC | Age: 62
End: 2022-07-01

## 2022-07-01 DIAGNOSIS — M79.672 LEFT FOOT PAIN: Primary | ICD-10-CM

## 2022-07-01 NOTE — TELEPHONE ENCOUNTER
Caller: Cindy Deng    Relationship: Self    Best call back number: [phone]    What is the medical concern/diagnosis: PAIN IN LEFT FOOT    What specialty or service is being requested: INJECTIONS IN LEFT FOOT    What is the provider, practice or medical service name: DR. DAVEY    What is the office location: [place]    Any additional details:   PATIENT WAS REFERRED BY DR. POLO TO DR. DAVEY FOR LEFT FOOT PAIN AND HE HAS BEEN GIVING PATIENT INJECTIONS TO HELP. THEY ARE NEEDING A RENEWAL OF THE REFERRAL FOR PATIENT TO CONTINUE SEEING THEM FOR APPOINTMENTS.

## 2022-09-01 ENCOUNTER — TELEPHONE (OUTPATIENT)
Dept: FAMILY MEDICINE CLINIC | Age: 62
End: 2022-09-01

## 2022-09-01 DIAGNOSIS — M25.511 CHRONIC RIGHT SHOULDER PAIN: Primary | ICD-10-CM

## 2022-09-01 DIAGNOSIS — G89.29 CHRONIC RIGHT SHOULDER PAIN: Primary | ICD-10-CM

## 2022-09-01 NOTE — TELEPHONE ENCOUNTER
Caller: Cindy Deng    Relationship to patient: Self    Best call back number: [phone]    Patient is needing: PATIENT CALLED IN AND SAID THAT SHE IS NEEDING TO BE SET UP WITH PHYSICAL THERAPY FOR HER SHOULD AND SHE WOULD LIKE TO HAVE THAT PT IN [place]. PLEASE CALL AND ADVISE.

## 2022-09-02 ENCOUNTER — TRANSCRIBE ORDERS (OUTPATIENT)
Dept: PHYSICAL THERAPY | Facility: CLINIC | Age: 62
End: 2022-09-02

## 2022-09-02 DIAGNOSIS — Z98.890 S/P ROTATOR CUFF REPAIR: Primary | ICD-10-CM

## 2022-09-02 DIAGNOSIS — M25.511 RIGHT SHOULDER PAIN, UNSPECIFIED CHRONICITY: ICD-10-CM

## 2022-09-06 ENCOUNTER — TREATMENT (OUTPATIENT)
Dept: PHYSICAL THERAPY | Facility: CLINIC | Age: 62
End: 2022-09-06

## 2022-09-06 DIAGNOSIS — R29.898 SHOULDER WEAKNESS: ICD-10-CM

## 2022-09-06 DIAGNOSIS — Z98.890 S/P RIGHT ROTATOR CUFF REPAIR: Primary | ICD-10-CM

## 2022-09-06 DIAGNOSIS — M25.611 DECREASED ROM OF RIGHT SHOULDER: ICD-10-CM

## 2022-09-06 DIAGNOSIS — M25.511 ACUTE PAIN OF RIGHT SHOULDER: ICD-10-CM

## 2022-09-06 PROCEDURE — 97161 PT EVAL LOW COMPLEX 20 MIN: CPT | Performed by: PHYSICAL THERAPIST

## 2022-09-06 NOTE — PROGRESS NOTES
Physical Therapy Initial Evaluation and Plan of Care      Patient: Cindy Deng   : 1960  Diagnosis/ICD-10 Code:  S/P right rotator cuff repair [Z98.890]  Referring practitioner: Chavo Marquis DO  Date of Initial Visit: 2022  Today's Date: 2022  Patient seen for 1 sessions         Visit Diagnoses:    ICD-10-CM ICD-9-CM   1. S/P right rotator cuff repair  Z98.890 V45.89       Subjective Questionnaire: QuickDASH: 81.82      Subjective Evaluation    History of Present Illness  Mechanism of injury: Pt presents to [facility] PHYS[facility]ER[facility]Y to be evaluated S/P R RCR, SAD, acromioplasty. 22.    5 weeks   RTD 2022    States has been in pain, taking pain medication.  Relates wearing sling most of the time.   Relates pain if tries to move arm.   Stopped using ice, but may resume.   Denies a HEP.     -removed pillow at 30 days      Patient Occupation: retired  Quality of life: good    Pain  Current pain ratin  Location: R shoulder  Relieving factors: rest    Social Support  Lives with: spouse    Hand dominance: left    Patient Goals  Patient goals for therapy: increased strength, independence with ADLs/IADLs, return to sport/leisure activities, decreased pain and increased motion             Objective           General Comments     Shoulder Comments   Pt presents with R shoulder in sling.   Portals well healed trace adhesion.    AROM :  R hand, wrist, elbow WN    PROM: R shoulder :  Flexion 60, abd 30, ER to neutral, IR to belly,   Mild AA with PROM.     Posture fairly good, good scapular retraction    Mild TTP over ant and superior shoulder         Assessment & Plan     Assessment  Impairments: abnormal or restricted ROM, activity intolerance, impaired physical strength, lacks appropriate home exercise program and pain with function  Other impairment: precaution: no AROM shoulder  Functional Limitations: carrying objects, lifting, sleeping, pulling, pushing,  uncomfortable because of pain, moving in bed, reaching behind back and reaching overhead  Assessment details: Pt presents with decreased functional capacity S/P R RCR 8//, and associated surgical precautions.     Pt relates/ demonstrates:   -unable/ not allowed to perform AROM R shoulder per surgical limitations,  -limitation with lifting , pushing, pulling, carrying, due to surgical limitations,  -limited PROM R shoulder,  -shoulder girdle weakness,   -limited scapular mobility,  -limitation performing hobbies/community activities,   -interference with sleep,   -assistance necessary to perform ADLs,   -a lack of knowledge regarding progression post op/ HEP.     Pt would benefit from skilled physical  therapy intervention to address the above mentioned deficits.     Time was spent discussing anatomy, surgical procedure, appropriate expectations of progression, and  therapy treatment plan. Discussed posture and surgical precautions.      -AROM L hand, wrist, parameters for elbow flexion / extension  -scapular AROM parameters  -sleeping position suggestions  -sling wear schedule/ fitting, adjusted sling  -aerobic exercise  -hydration/ diet  -wound care  -precautions  -posture  -use of ice    Pt was given WHEP of today's discussion, parameters, precautions and related understanding.     Pt's questions and concerns were addressed throughout the session  as they arose.          Barriers to therapy: not driving yet  Prognosis: good    Goals  Plan Goals: ST-6 weeks  Pt will demonstrate/ report:     -understanding and compliance with surgical precautions.    -decreased c/o pain R shoulder 2-3/10, 50-75% of the time.    -increased shoulder PROM to 80 degrees elevation in scaption, min/no c/o pain.    -increased shoulder PROM to ER to 30    -increased scapular stabilization with isometric exercises as indicated per protocol.     -proper posture with scapular retraction.    -sleeping 75%, or more , of the night without  waking due to shoulder pain.     -I in HEP each visit.     -improve score on QUICK DASH.       LT-12 weeks  Pt will report/ demonstrate:     -functional  PROM R shoulder with min c/o pain or stiffness.      -improved functional use of R shoulder, with appropriate scapular stabilization, to perform ADLs, hobbies, home chores with min/ no limitation .    - R shoulder strength 4-4/5 to perform home tasks and community activities as needed, with min/no limitation.     -increased scapular stabilization with functional use of  UE.    -resumption of hobbies with min limitations due to shoulder limitations.      -improve score on QUICK DASH     -be I in final HEP and progression parameters to perform after formal physical therapy has been discontinued      Plan  Therapy options: will be seen for skilled therapy services  Planned modality interventions: cryotherapy and high voltage pulsed current (pain management)  Other planned modality interventions: any  other modality as indicated to benefit the pt  Planned therapy interventions: flexibility, functional ROM exercises, home exercise program, manual therapy, abdominal trunk stabilization, postural training, soft tissue mobilization, strengthening, stretching, therapeutic activities, neuromuscular re-education and joint mobilization  Other planned therapy interventions: any  other modality as indicated to benefit the pt  Frequency: 2x week  Duration in weeks: 12  Treatment plan discussed with: patient and PTA  Plan details: Continue therapy involving education, PROM, stretching, scapular stabilization, modalities as indicated, HEP instruction initially.     Will progress as indicated/tolerated / per protocol to strengthening, shoulder girdle and trunk stabilization training, modalities as indicated, manual therapy techniques as indicated, progressive HEP instruction.    Next visit, reassess tolerance to current exercises and modify and/or progress as indicated.            Planned interventions:  Any other modality and/or intervention deemed necessary to benefit the patient.        History # of Personal Factors and/or Comorbidities: MODERATE (1-2)  Examination of Body System(s): # of elements: HIGH (4+)  Clinical Presentation: STABLE   Clinical Decision Making: LOW       Timed:  Manual Therapy:         mins  65528;  Therapeutic Exercise:         mins  30940;     Neuromuscular Ruben:        mins  06157;    Therapeutic Activity:          mins  78991;     Gait Training:           mins  09950;     Ultrasound:          mins  61335;    Canalith Repos           ___  mins  07053      Untimed:  Electrical Stimulation:         mins  65290 ( );  Mechanical Traction:         mins  37340;     Low Complexity Evaluation:                      35     mins  51409;  Moderate Complexity Evaluation:                  mins  76162;   High Complexity Evaluation:                          mins  64656       Timed Treatment:      mins   Total Treatment:     35   mins      DATE TREATMENT INITIATED: 9/6/2022              PT SIGNATURE: Rhina Forde PT   [place] License: [id number]  This document has been electronically signed by Rhina Forde PT on September 6, 2022 15:45 EDT        Initial Certification    Certification Period: 9/6/2022 thru 12/4/2022  I certify that the therapy services are furnished while this patient is under my care.  The services outlined above are required by this patient, and will be reviewed every 90 days.     PHYSICIAN: Chavo Marquis DO   NPI: [id number]      PHYSICIAN PRINT NAME: ______________________________________________      PHYSICIAN SIGNATURE: ______________________________________________         DATE:________________________________

## 2022-09-08 ENCOUNTER — TREATMENT (OUTPATIENT)
Dept: PHYSICAL THERAPY | Facility: CLINIC | Age: 62
End: 2022-09-08

## 2022-09-08 DIAGNOSIS — G89.29 CHRONIC RIGHT SHOULDER PAIN: ICD-10-CM

## 2022-09-08 DIAGNOSIS — M25.511 ACUTE PAIN OF RIGHT SHOULDER: ICD-10-CM

## 2022-09-08 DIAGNOSIS — R29.898 SHOULDER WEAKNESS: ICD-10-CM

## 2022-09-08 DIAGNOSIS — Z98.890 S/P RIGHT ROTATOR CUFF REPAIR: Primary | ICD-10-CM

## 2022-09-08 DIAGNOSIS — M25.611 DECREASED ROM OF RIGHT SHOULDER: ICD-10-CM

## 2022-09-08 DIAGNOSIS — M25.511 CHRONIC RIGHT SHOULDER PAIN: ICD-10-CM

## 2022-09-08 DIAGNOSIS — M75.41 IMPINGEMENT SYNDROME OF BOTH SHOULDERS: ICD-10-CM

## 2022-09-08 DIAGNOSIS — M75.42 IMPINGEMENT SYNDROME OF BOTH SHOULDERS: ICD-10-CM

## 2022-09-08 PROCEDURE — 97140 MANUAL THERAPY 1/> REGIONS: CPT | Performed by: PHYSICAL THERAPIST

## 2022-09-08 PROCEDURE — G0283 ELEC STIM OTHER THAN WOUND: HCPCS | Performed by: PHYSICAL THERAPIST

## 2022-09-08 NOTE — PROGRESS NOTES
"Physical Therapy Daily Treatment Note      Patient: Cindy Deng   : 1960  Referring practitioner: Chavo Marquis DO  Date of Initial Visit: Type: THERAPY  Noted: 2022  Today's Date: 2022  Patient seen for 2 sessions           Subjective Evaluation    History of Present Illness    Subjective comment: 7/10       Objective           General Comments     Shoulder Comments   Pain in the form of a \"catch\" noted with eccentric lowering of the R UE during PROM.     See Exercise, Manual, and Modality Logs for complete treatment.       Assessment & Plan     Assessment  Impairments: abnormal or restricted ROM, activity intolerance, impaired physical strength, lacks appropriate home exercise program and pain with function  Other impairment: precaution: no AROM shoulder  Functional Limitations: carrying objects, lifting, sleeping, pulling, pushing, uncomfortable because of pain, moving in bed, reaching behind back and reaching overhead  Assessment details: Pt is 4 weeks out at this visit. Pt has high pain level and there is a \"catch\" that pt reports during manual PROM. Lots of cuing to relax the SHLD and allow therapist to move. Over all pt is showing good range at this time. Called MD office and asked for protocol and was told that he doesn't have one that we could use a standard one that we have on hand.    Barriers to therapy: not driving yet  Prognosis: good    Goals  Plan Goals: ST-6 weeks  Pt will demonstrate/ report:     -understanding and compliance with surgical precautions.    -decreased c/o pain R shoulder 2-3/10, 50-75% of the time.    -increased shoulder PROM to 80 degrees elevation in scaption, min/no c/o pain.    -increased shoulder PROM to ER to 30    -increased scapular stabilization with isometric exercises as indicated per protocol.     -proper posture with scapular retraction.    -sleeping 75%, or more , of the night without waking due to shoulder pain.     -I in HEP each visit. "     -improve score on QUICK DASH.       LT-12 weeks  Pt will report/ demonstrate:     -functional  PROM R shoulder with min c/o pain or stiffness.      -improved functional use of R shoulder, with appropriate scapular stabilization, to perform ADLs, hobbies, home chores with min/ no limitation .    - R shoulder strength 4-4/5 to perform home tasks and community activities as needed, with min/no limitation.     -increased scapular stabilization with functional use of  UE.    -resumption of hobbies with min limitations due to shoulder limitations.      -improve score on QUICK DASH     -be I in final HEP and progression parameters to perform after formal physical therapy has been discontinued      Plan  Therapy options: will be seen for skilled therapy services  Planned modality interventions: cryotherapy and high voltage pulsed current (pain management)  Other planned modality interventions: any other modality as indicated to benefit the pt  Planned therapy interventions: flexibility, functional ROM exercises, home exercise program, manual therapy, abdominal trunk stabilization, postural training, soft tissue mobilization, strengthening, stretching, therapeutic activities, neuromuscular re-education and joint mobilization  Other planned therapy interventions: any  other modality as indicated to benefit the pt  Frequency: 2x week  Duration in weeks: 12  Treatment plan discussed with: patient and PTA  Plan details: Cont to protect tissue. Keep motion in the SHLD to prevent adhesions. Address pain as it arises.            Visit Diagnoses:    ICD-10-CM ICD-9-CM   1. S/P right rotator cuff repair  Z98.890 V45.89   2. Acute pain of right shoulder  M25.511 719.41   3. Shoulder weakness  R29.898 719.61   4. Decreased ROM of right shoulder  M25.611 719.51   5. Chronic right shoulder pain  M25.511 719.41    G89.29 338.29   6. Impingement syndrome of both shoulders  M75.41 726.2    M75.42        Progress per Plan of  Care    Timed:    Therapeutic Exercise:         mins  41631;  Manual Therapy:    23     mins  99454;     Neuromuscular Ruben:       mins  53270;    Therapeutic Activity:          mins  49771;     Gait Training:           mins  51476;     Ultrasound:          mins  87742;      Untimed:  Electrical Stimulation:   15      mins  80468 ( );  Mechanical Traction:         mins  21144;     Timed Treatment:   23   mins   Total Treatment:     40   mins      Bonny Simental PTA  Physical Therapist Assistant

## 2022-09-12 ENCOUNTER — TREATMENT (OUTPATIENT)
Dept: PHYSICAL THERAPY | Facility: CLINIC | Age: 62
End: 2022-09-12

## 2022-09-12 DIAGNOSIS — M25.511 ACUTE PAIN OF RIGHT SHOULDER: ICD-10-CM

## 2022-09-12 DIAGNOSIS — M25.611 DECREASED ROM OF RIGHT SHOULDER: ICD-10-CM

## 2022-09-12 DIAGNOSIS — Z98.890 S/P RIGHT ROTATOR CUFF REPAIR: Primary | ICD-10-CM

## 2022-09-12 DIAGNOSIS — M75.42 IMPINGEMENT SYNDROME OF BOTH SHOULDERS: ICD-10-CM

## 2022-09-12 DIAGNOSIS — M75.41 IMPINGEMENT SYNDROME OF BOTH SHOULDERS: ICD-10-CM

## 2022-09-12 DIAGNOSIS — R29.898 SHOULDER WEAKNESS: ICD-10-CM

## 2022-09-12 DIAGNOSIS — G89.29 CHRONIC RIGHT SHOULDER PAIN: ICD-10-CM

## 2022-09-12 DIAGNOSIS — M25.511 CHRONIC RIGHT SHOULDER PAIN: ICD-10-CM

## 2022-09-12 PROCEDURE — 97140 MANUAL THERAPY 1/> REGIONS: CPT | Performed by: PHYSICAL THERAPIST

## 2022-09-12 PROCEDURE — G0283 ELEC STIM OTHER THAN WOUND: HCPCS | Performed by: PHYSICAL THERAPIST

## 2022-09-12 NOTE — PROGRESS NOTES
Physical Therapy Daily Treatment Note      Patient: Cindy Deng   : 1960  Referring practitioner: Chavo Marquis DO  Date of Initial Visit: Type: THERAPY  Noted: 2022  Today's Date: 2022  Patient seen for 3 sessions           Subjective Evaluation    History of Present Illness    Subjective comment: 4-5/10       Objective           General Comments     Shoulder Comments   Catch with eccentric lower during PROM still noted.     See Exercise, Manual, and Modality Logs for complete treatment.       Assessment & Plan     Assessment  Impairments: abnormal or restricted ROM, activity intolerance, impaired physical strength, lacks appropriate home exercise program and pain with function  Other impairment: precaution: no AROM shoulder  Functional Limitations: carrying objects, lifting, sleeping, pulling, pushing, uncomfortable because of pain, moving in bed, reaching behind back and reaching overhead  Assessment details: Pt cont to require constant vc to relax and drop SHLD. There is still a catch noted during PROM on the eccentric lowering. At times distraction of the SHLD is uncomfortable to the pt.  Very light movements performed with distraction.    Barriers to therapy: not driving yet  Prognosis: good    Goals  Plan Goals: ST-6 weeks  Pt will demonstrate/ report:     -understanding and compliance with surgical precautions.    -decreased c/o pain R shoulder 2-3/10, 50-75% of the time.    -increased shoulder PROM to 80 degrees elevation in scaption, min/no c/o pain.    -increased shoulder PROM to ER to 30    -increased scapular stabilization with isometric exercises as indicated per protocol.     -proper posture with scapular retraction.    -sleeping 75%, or more , of the night without waking due to shoulder pain.     -I in HEP each visit.     -improve score on QUICK DASH.       LT-12 weeks  Pt will report/ demonstrate:     -functional  PROM R shoulder with min c/o pain or stiffness.       -improved functional use of R shoulder, with appropriate scapular stabilization, to perform ADLs, hobbies, home chores with min/ no limitation .    - R shoulder strength 4-4/5 to perform home tasks and community activities as needed, with min/no limitation.     -increased scapular stabilization with functional use of  UE.    -resumption of hobbies with min limitations due to shoulder limitations.      -improve score on QUICK DASH     -be I in final HEP and progression parameters to perform after formal physical therapy has been discontinued      Plan  Therapy options: will be seen for skilled therapy services  Planned modality interventions: cryotherapy and high voltage pulsed current (pain management)  Other planned modality interventions: any other modality as indicated to benefit the pt  Planned therapy interventions: flexibility, functional ROM exercises, home exercise program, manual therapy, abdominal trunk stabilization, postural training, soft tissue mobilization, strengthening, stretching, therapeutic activities, neuromuscular re-education and joint mobilization  Other planned therapy interventions: any  other modality as indicated to benefit the pt  Frequency: 2x week  Duration in weeks: 12  Treatment plan discussed with: patient and PTA  Plan details: Cont to protect tissue. Keep motion in the SHLD to prevent adhesions. Address pain as it arises.            Visit Diagnoses:    ICD-10-CM ICD-9-CM   1. S/P right rotator cuff repair  Z98.890 V45.89   2. Acute pain of right shoulder  M25.511 719.41   3. Shoulder weakness  R29.898 719.61   4. Decreased ROM of right shoulder  M25.611 719.51   5. Chronic right shoulder pain  M25.511 719.41    G89.29 338.29   6. Impingement syndrome of both shoulders  M75.41 726.2    M75.42        Progress per Plan of Care    Timed:    Therapeutic Exercise:         mins  01147;  Manual Therapy:    12     mins  04406;     Neuromuscular Ruben:       mins  57621;    Therapeutic  Activity:          mins  99298;     Gait Training:           mins  15512;     Ultrasound:          mins  01761;      Untimed:  Electrical Stimulation:    15     mins  49337 ( );  Mechanical Traction:         mins  79340;     Timed Treatment:   12   mins   Total Treatment:     30   mins      Bonny Simental PTA  Physical Therapist Assistant

## 2022-09-15 ENCOUNTER — TREATMENT (OUTPATIENT)
Dept: PHYSICAL THERAPY | Facility: CLINIC | Age: 62
End: 2022-09-15

## 2022-09-15 DIAGNOSIS — M25.511 ACUTE PAIN OF RIGHT SHOULDER: ICD-10-CM

## 2022-09-15 DIAGNOSIS — R29.898 SHOULDER WEAKNESS: ICD-10-CM

## 2022-09-15 DIAGNOSIS — M75.42 IMPINGEMENT SYNDROME OF BOTH SHOULDERS: ICD-10-CM

## 2022-09-15 DIAGNOSIS — G89.29 CHRONIC RIGHT SHOULDER PAIN: ICD-10-CM

## 2022-09-15 DIAGNOSIS — M75.41 IMPINGEMENT SYNDROME OF BOTH SHOULDERS: ICD-10-CM

## 2022-09-15 DIAGNOSIS — Z98.890 S/P RIGHT ROTATOR CUFF REPAIR: Primary | ICD-10-CM

## 2022-09-15 DIAGNOSIS — M25.511 CHRONIC RIGHT SHOULDER PAIN: ICD-10-CM

## 2022-09-15 DIAGNOSIS — M25.611 DECREASED ROM OF RIGHT SHOULDER: ICD-10-CM

## 2022-09-15 PROCEDURE — G0283 ELEC STIM OTHER THAN WOUND: HCPCS | Performed by: PHYSICAL THERAPIST

## 2022-09-15 PROCEDURE — 97110 THERAPEUTIC EXERCISES: CPT | Performed by: PHYSICAL THERAPIST

## 2022-09-15 NOTE — PROGRESS NOTES
Physical Therapy Daily Treatment Note      Patient: Cindy Deng   : 1960  Referring practitioner: Chavo Marquis DO  Date of Initial Visit: Type: THERAPY  Noted: 2022  Today's Date: 9/15/2022  Patient seen for 4 sessions           Subjective Evaluation    History of Present Illness    Subjective comment: 5/10 but 6/10 with exercises       Objective           General Comments     Shoulder Comments   Pt cont to be heavily guarded at tx.     See Exercise, Manual, and Modality Logs for complete treatment.       Assessment & Plan     Assessment  Impairments: abnormal or restricted ROM, activity intolerance, impaired physical strength, lacks appropriate home exercise program and pain with function  Other impairment: precaution: no AROM shoulder  Functional Limitations: carrying objects, lifting, sleeping, pulling, pushing, uncomfortable because of pain, moving in bed, reaching behind back and reaching overhead  Assessment details: Pt started new exercises requiring vc and demonstration for safe and effective performance. There was some hypersensitivity to movements at this visit but pt moved through the exercises. There still appears to be a catch on eccentric lower in scaption.     Barriers to therapy: not driving yet  Prognosis: good    Goals  Plan Goals: ST-6 weeks  Pt will demonstrate/ report:     -understanding and compliance with surgical precautions.    -decreased c/o pain R shoulder 2-3/10, 50-75% of the time.    -increased shoulder PROM to 80 degrees elevation in scaption, min/no c/o pain.    -increased shoulder PROM to ER to 30    -increased scapular stabilization with isometric exercises as indicated per protocol.     -proper posture with scapular retraction.    -sleeping 75%, or more , of the night without waking due to shoulder pain.     -I in HEP each visit.     -improve score on QUICK DASH.       LT-12 weeks  Pt will report/ demonstrate:     -functional  PROM R shoulder with min  c/o pain or stiffness.      -improved functional use of R shoulder, with appropriate scapular stabilization, to perform ADLs, hobbies, home chores with min/ no limitation .    - R shoulder strength 4-4/5 to perform home tasks and community activities as needed, with min/no limitation.     -increased scapular stabilization with functional use of  UE.    -resumption of hobbies with min limitations due to shoulder limitations.      -improve score on QUICK DASH     -be I in final HEP and progression parameters to perform after formal physical therapy has been discontinued      Plan  Therapy options: will be seen for skilled therapy services  Planned modality interventions: cryotherapy and high voltage pulsed current (pain management)  Other planned modality interventions: any other modality as indicated to benefit the pt  Planned therapy interventions: flexibility, functional ROM exercises, home exercise program, manual therapy, abdominal trunk stabilization, postural training, soft tissue mobilization, strengthening, stretching, therapeutic activities, neuromuscular re-education and joint mobilization  Other planned therapy interventions: any  other modality as indicated to benefit the pt  Frequency: 2x week  Duration in weeks: 12  Treatment plan discussed with: patient and PTA  Plan details: Assess the tolerance of the last tx session. Add UBE and new exercises as pt is tolerant. Manual as needed.            Visit Diagnoses:    ICD-10-CM ICD-9-CM   1. S/P right rotator cuff repair  Z98.890 V45.89   2. Acute pain of right shoulder  M25.511 719.41   3. Shoulder weakness  R29.898 719.61   4. Decreased ROM of right shoulder  M25.611 719.51   5. Chronic right shoulder pain  M25.511 719.41    G89.29 338.29   6. Impingement syndrome of both shoulders  M75.41 726.2    M75.42        Progress per Plan of Care    Timed:    Therapeutic Exercise:    23     mins  44993;  Manual Therapy:         mins  60625;     Neuromuscular Ruben:        mins  42279;    Therapeutic Activity:          mins  04477;     Gait Training:           mins  34143;     Ultrasound:          mins  78407;      Untimed:  Electrical Stimulation:  15       mins  78099 ( );  Mechanical Traction:         mins  03463;     Timed Treatment:   23   mins   Total Treatment:     40   mins      Bonny Simental PTA  Physical Therapist Assistant

## 2022-09-19 ENCOUNTER — TREATMENT (OUTPATIENT)
Dept: PHYSICAL THERAPY | Facility: CLINIC | Age: 62
End: 2022-09-19

## 2022-09-19 DIAGNOSIS — R29.898 SHOULDER WEAKNESS: ICD-10-CM

## 2022-09-19 DIAGNOSIS — Z98.890 S/P RIGHT ROTATOR CUFF REPAIR: Primary | ICD-10-CM

## 2022-09-19 DIAGNOSIS — M25.611 DECREASED ROM OF RIGHT SHOULDER: ICD-10-CM

## 2022-09-19 DIAGNOSIS — M25.511 ACUTE PAIN OF RIGHT SHOULDER: ICD-10-CM

## 2022-09-19 PROCEDURE — 97140 MANUAL THERAPY 1/> REGIONS: CPT | Performed by: PHYSICAL THERAPIST

## 2022-09-19 PROCEDURE — 97110 THERAPEUTIC EXERCISES: CPT | Performed by: PHYSICAL THERAPIST

## 2022-09-19 NOTE — PROGRESS NOTES
Physical Therapy Daily Treatment Note      Patient: Cindy Deng   : 1960  Referring practitioner: Chavo Marquis DO  Date of Initial Visit: Type: THERAPY  Noted: 2022  Today's Date: 2022  Patient seen for 5 sessions           Subjective Evaluation    History of Present Illness    Subjective comment: 3/10       Objective   See Exercise, Manual, and Modality Logs for complete treatment.       Assessment & Plan     Assessment  Impairments: abnormal or restricted ROM, activity intolerance, impaired physical strength, lacks appropriate home exercise program and pain with function  Other impairment: precaution: no AROM shoulder  Functional Limitations: carrying objects, lifting, sleeping, pulling, pushing, uncomfortable because of pain, moving in bed, reaching behind back and reaching overhead  Assessment details: 7 weeks post op. Pt started new exercises requiring vc and demonstration for safe and effective performance. Pt is performing the isometrics at home.  There is not a lot of restrictions in the SHLD other then guarding pattern of pt. All tx was tolerated without issue.    Barriers to therapy: not driving yet  Prognosis: good    Goals  Plan Goals: ST-6 weeks  Pt will demonstrate/ report:     -understanding and compliance with surgical precautions.    -decreased c/o pain R shoulder 2-3/10, 50-75% of the time.    -increased shoulder PROM to 80 degrees elevation in scaption, min/no c/o pain.    -increased shoulder PROM to ER to 30    -increased scapular stabilization with isometric exercises as indicated per protocol.     -proper posture with scapular retraction.    -sleeping 75%, or more , of the night without waking due to shoulder pain.     -I in HEP each visit.     -improve score on QUICK DASH.       LT-12 weeks  Pt will report/ demonstrate:     -functional  PROM R shoulder with min c/o pain or stiffness.      -improved functional use of R shoulder, with appropriate scapular  stabilization, to perform ADLs, hobbies, home chores with min/ no limitation .    - R shoulder strength 4-4/5 to perform home tasks and community activities as needed, with min/no limitation.     -increased scapular stabilization with functional use of  UE.    -resumption of hobbies with min limitations due to shoulder limitations.      -improve score on QUICK DASH     -be I in final HEP and progression parameters to perform after formal physical therapy has been discontinued      Plan  Therapy options: will be seen for skilled therapy services  Planned modality interventions: cryotherapy and high voltage pulsed current (pain management)  Other planned modality interventions: any other modality as indicated to benefit the pt  Planned therapy interventions: flexibility, functional ROM exercises, home exercise program, manual therapy, abdominal trunk stabilization, postural training, soft tissue mobilization, strengthening, stretching, therapeutic activities, neuromuscular re-education and joint mobilization  Other planned therapy interventions: any  other modality as indicated to benefit the pt  Frequency: 2x week  Duration in weeks: 12  Treatment plan discussed with: patient and PTA  Plan details: Assess the tolerance of the last tx session. Add UBE and new exercises as pt is tolerant. Manual as needed.            Visit Diagnoses:    ICD-10-CM ICD-9-CM   1. S/P right rotator cuff repair  Z98.890 V45.89   2. Acute pain of right shoulder  M25.511 719.41   3. Shoulder weakness  R29.898 719.61   4. Decreased ROM of right shoulder  M25.611 719.51       Progress per Plan of Care    Timed:    Therapeutic Exercise:    13     mins  18768;  Manual Therapy:    14     mins  43041;     Neuromuscular Ruben:       mins  20137;    Therapeutic Activity:          mins  28846;     Gait Training:           mins  14322;     Ultrasound:          mins  64335;      Untimed:  Electrical Stimulation:         mins  66866 (  );  Mechanical Traction:         mins  58715;     Timed Treatment:   27   mins   Total Treatment:     30   mins      Bonny Simental PTA  Physical Therapist Assistant

## 2022-09-22 ENCOUNTER — TREATMENT (OUTPATIENT)
Dept: PHYSICAL THERAPY | Facility: CLINIC | Age: 62
End: 2022-09-22

## 2022-09-22 DIAGNOSIS — R29.898 SHOULDER WEAKNESS: ICD-10-CM

## 2022-09-22 DIAGNOSIS — M25.511 CHRONIC RIGHT SHOULDER PAIN: ICD-10-CM

## 2022-09-22 DIAGNOSIS — M75.42 IMPINGEMENT SYNDROME OF BOTH SHOULDERS: ICD-10-CM

## 2022-09-22 DIAGNOSIS — Z98.890 S/P RIGHT ROTATOR CUFF REPAIR: Primary | ICD-10-CM

## 2022-09-22 DIAGNOSIS — M75.41 IMPINGEMENT SYNDROME OF BOTH SHOULDERS: ICD-10-CM

## 2022-09-22 DIAGNOSIS — M25.511 ACUTE PAIN OF RIGHT SHOULDER: ICD-10-CM

## 2022-09-22 DIAGNOSIS — G89.29 CHRONIC RIGHT SHOULDER PAIN: ICD-10-CM

## 2022-09-22 DIAGNOSIS — M25.611 DECREASED ROM OF RIGHT SHOULDER: ICD-10-CM

## 2022-09-22 PROCEDURE — G0283 ELEC STIM OTHER THAN WOUND: HCPCS | Performed by: PHYSICAL THERAPIST

## 2022-09-22 PROCEDURE — 97140 MANUAL THERAPY 1/> REGIONS: CPT | Performed by: PHYSICAL THERAPIST

## 2022-09-22 PROCEDURE — 97110 THERAPEUTIC EXERCISES: CPT | Performed by: PHYSICAL THERAPIST

## 2022-09-22 NOTE — PROGRESS NOTES
Physical Therapy Daily Treatment Note      Patient: Cindy Deng   : 1960  Referring practitioner: Chavo Marquis DO  Date of Initial Visit: Type: THERAPY  Noted: 2022  Today's Date: 2022  Patient seen for 6 sessions           Subjective Evaluation    History of Present Illness    Subjective comment: 6/10 discribed as aching       Objective   See Exercise, Manual, and Modality Logs for complete treatment.       Assessment & Plan     Assessment  Impairments: abnormal or restricted ROM, activity intolerance, impaired physical strength, lacks appropriate home exercise program and pain with function  Other impairment: precaution: no AROM shoulder  Functional Limitations: carrying objects, lifting, sleeping, pulling, pushing, uncomfortable because of pain, moving in bed, reaching behind back and reaching overhead  Assessment details: Pt cont to have pain with flexion of the UE but is less painful with scaption. Pt requested estim at the end of tx stating it felt good. AAROM in flexion was painful today so that was stopped at 10 reps. Overall tx was tolerated well and no issues arose.    Barriers to therapy: not driving yet  Prognosis: good    Goals  Plan Goals: ST-6 weeks  Pt will demonstrate/ report:     -understanding and compliance with surgical precautions.    -decreased c/o pain R shoulder 2-3/10, 50-75% of the time.    -increased shoulder PROM to 80 degrees elevation in scaption, min/no c/o pain.    -increased shoulder PROM to ER to 30    -increased scapular stabilization with isometric exercises as indicated per protocol.     -proper posture with scapular retraction.    -sleeping 75%, or more , of the night without waking due to shoulder pain.     -I in HEP each visit.     -improve score on QUICK DASH.       LT-12 weeks  Pt will report/ demonstrate:     -functional  PROM R shoulder with min c/o pain or stiffness.      -improved functional use of R shoulder, with appropriate scapular  stabilization, to perform ADLs, hobbies, home chores with min/ no limitation .    - R shoulder strength 4-4/5 to perform home tasks and community activities as needed, with min/no limitation.     -increased scapular stabilization with functional use of  UE.    -resumption of hobbies with min limitations due to shoulder limitations.      -improve score on QUICK DASH     -be I in final HEP and progression parameters to perform after formal physical therapy has been discontinued      Plan  Therapy options: will be seen for skilled therapy services  Planned modality interventions: cryotherapy and high voltage pulsed current (pain management)  Other planned modality interventions: any other modality as indicated to benefit the pt  Planned therapy interventions: flexibility, functional ROM exercises, home exercise program, manual therapy, abdominal trunk stabilization, postural training, soft tissue mobilization, strengthening, stretching, therapeutic activities, neuromuscular re-education and joint mobilization  Other planned therapy interventions: any  other modality as indicated to benefit the pt  Frequency: 2x week  Duration in weeks: 12  Treatment plan discussed with: patient and PTA  Plan details: Cont to address ROM and pain. Increase the activity level while staying within protocol.            Visit Diagnoses:    ICD-10-CM ICD-9-CM   1. S/P right rotator cuff repair  Z98.890 V45.89   2. Acute pain of right shoulder  M25.511 719.41   3. Shoulder weakness  R29.898 719.61   4. Decreased ROM of right shoulder  M25.611 719.51   5. Impingement syndrome of both shoulders  M75.41 726.2    M75.42    6. Chronic right shoulder pain  M25.511 719.41    G89.29 338.29       Progress per Plan of Care    Timed:    Therapeutic Exercise:    16     mins  01147;  Manual Therapy:    12     mins  17284;     Neuromuscular Ruben:       mins  93721;    Therapeutic Activity:          mins  04682;     Gait Training:           mins  81975;      Ultrasound:          mins  12268;      Untimed:  Electrical Stimulation:    15     mins  52555 ( );  Mechanical Traction:         mins  08126;     Timed Treatment:   28   mins   Total Treatment:     45   mins      Bonny Simental PTA  Physical Therapist Assistant

## 2022-09-26 ENCOUNTER — TREATMENT (OUTPATIENT)
Dept: PHYSICAL THERAPY | Facility: CLINIC | Age: 62
End: 2022-09-26

## 2022-09-26 PROCEDURE — 97140 MANUAL THERAPY 1/> REGIONS: CPT | Performed by: PHYSICAL THERAPIST

## 2022-09-26 PROCEDURE — 97110 THERAPEUTIC EXERCISES: CPT | Performed by: PHYSICAL THERAPIST

## 2022-09-26 NOTE — PROGRESS NOTES
"Physical Therapy Daily Treatment Note      Patient: Cindy Deng   : 1960  Referring practitioner: Chavo Marquis DO  Date of Initial Visit: Type: THERAPY  Noted: 2022  Today's Date: 2022  Patient seen for 7 sessions           Subjective Evaluation    History of Present Illness    Subjective comment: 5/10       Objective          Passive Range of Motion     Right Shoulder   Flexion: 154 degrees   External rotation 90°: 73 degrees   Internal rotation 45°: 57 degrees       See Exercise, Manual, and Modality Logs for complete treatment.       Assessment & Plan     Assessment  Impairments: abnormal or restricted ROM, activity intolerance, impaired physical strength, lacks appropriate home exercise program and pain with function  Other impairment: precaution: no AROM shoulder  Functional Limitations: carrying objects, lifting, sleeping, pulling, pushing, uncomfortable because of pain, moving in bed, reaching behind back and reaching overhead  Assessment details: Pt started new exercises requiring vc and demonstration for safe and effective performance. There is still a catch reported by pt with certain movements. Range is looking very well. Pt was able to get her arm behind her back with the IR stick stretch. ABD is still more of an issue and is primarily where the \"catch\" is felt.    Barriers to therapy: not driving yet  Prognosis: good    Goals  Plan Goals: ST-6 weeks  Pt will demonstrate/ report:     -understanding and compliance with surgical precautions.    -decreased c/o pain R shoulder 2-3/10, 50-75% of the time.    -increased shoulder PROM to 80 degrees elevation in scaption, min/no c/o pain.    -increased shoulder PROM to ER to 30    -increased scapular stabilization with isometric exercises as indicated per protocol.     -proper posture with scapular retraction.    -sleeping 75%, or more , of the night without waking due to shoulder pain.     -I in HEP each visit.     -improve score on " QUICK DASH.       LT-12 weeks  Pt will report/ demonstrate:     -functional  PROM R shoulder with min c/o pain or stiffness.      -improved functional use of R shoulder, with appropriate scapular stabilization, to perform ADLs, hobbies, home chores with min/ no limitation .    - R shoulder strength 4-4/5 to perform home tasks and community activities as needed, with min/no limitation.     -increased scapular stabilization with functional use of  UE.    -resumption of hobbies with min limitations due to shoulder limitations.      -improve score on QUICK DASH     -be I in final HEP and progression parameters to perform after formal physical therapy has been discontinued      Plan  Therapy options: will be seen for skilled therapy services  Planned modality interventions: cryotherapy and high voltage pulsed current (pain management)  Other planned modality interventions: any other modality as indicated to benefit the pt  Planned therapy interventions: flexibility, functional ROM exercises, home exercise program, manual therapy, abdominal trunk stabilization, postural training, soft tissue mobilization, strengthening, stretching, therapeutic activities, neuromuscular re-education and joint mobilization  Other planned therapy interventions: any  other modality as indicated to benefit the pt  Frequency: 2x week  Duration in weeks: 12  Treatment plan discussed with: patient and PTA  Plan details: Cont to increase strengthening to tolerance. Add stabilization techniques and functional movements.            Visit Diagnoses:  No diagnosis found.    Progress per Plan of Care    Timed:    Therapeutic Exercise:    17     mins  02217;  Manual Therapy:    10     mins  59044;     Neuromuscular Ruben:       mins  29084;    Therapeutic Activity:          mins  55812;     Gait Training:           mins  00855;     Ultrasound:          mins  77476;      Untimed:  Electrical Stimulation:         mins  29993 ( );  Mechanical  Traction:         mins  46406;     Timed Treatment:   27   mins   Total Treatment:     45   mins      Bonny Simental PTA  Physical Therapist Assistant

## 2022-10-03 ENCOUNTER — TELEPHONE (OUTPATIENT)
Dept: PHYSICAL THERAPY | Facility: OTHER | Age: 62
End: 2022-10-03

## 2022-10-04 ENCOUNTER — TREATMENT (OUTPATIENT)
Dept: PHYSICAL THERAPY | Facility: CLINIC | Age: 62
End: 2022-10-04

## 2022-10-04 DIAGNOSIS — M75.42 IMPINGEMENT SYNDROME OF BOTH SHOULDERS: ICD-10-CM

## 2022-10-04 DIAGNOSIS — M75.41 IMPINGEMENT SYNDROME OF BOTH SHOULDERS: ICD-10-CM

## 2022-10-04 DIAGNOSIS — Z98.890 S/P RIGHT ROTATOR CUFF REPAIR: Primary | ICD-10-CM

## 2022-10-04 DIAGNOSIS — M25.511 CHRONIC RIGHT SHOULDER PAIN: ICD-10-CM

## 2022-10-04 DIAGNOSIS — M25.611 DECREASED ROM OF RIGHT SHOULDER: ICD-10-CM

## 2022-10-04 DIAGNOSIS — G89.29 CHRONIC RIGHT SHOULDER PAIN: ICD-10-CM

## 2022-10-04 DIAGNOSIS — M25.511 ACUTE PAIN OF RIGHT SHOULDER: ICD-10-CM

## 2022-10-04 DIAGNOSIS — R29.898 SHOULDER WEAKNESS: ICD-10-CM

## 2022-10-04 PROCEDURE — 97530 THERAPEUTIC ACTIVITIES: CPT | Performed by: PHYSICAL THERAPIST

## 2022-10-04 PROCEDURE — G0283 ELEC STIM OTHER THAN WOUND: HCPCS | Performed by: PHYSICAL THERAPIST

## 2022-10-04 PROCEDURE — 97110 THERAPEUTIC EXERCISES: CPT | Performed by: PHYSICAL THERAPIST

## 2022-10-04 NOTE — PROGRESS NOTES
Progress Note      Patient: Cindy Deng   : 1960  Diagnosis/ICD-10 Code:  S/P right rotator cuff repair [Z98.890]  Referring practitioner: Chavo Marquis DO  Date of Initial Visit: Type: THERAPY  Noted: 2022  Today's Date: 10/4/2022  Patient seen for 8 sessions      Subjective:   Subjective Questionnaire: QuickDASH: = 36.36% Disability  Clinical Progress: improved  Home Program Compliance: Yes  Treatment has included: therapeutic exercise, manual therapy and therapeutic activity    Subjective   The patient reported that her shoulder pain today is a 4/10. Most of the pain is located on the front and side of her right shoulder. Over the past month, she has noticed improvements in her shoulder pain and motion. She can use her arm to do some things around the house, but is still cautious with it. It still hurts if she reaches the wrong way or too quickly. She would like to continue with PT at this time.    Objective          Active Range of Motion     Right Shoulder   Flexion: 105 degrees with pain  Extension: 55 degrees   Abduction: 80 degrees with pain  External rotation 0°: 62 degrees   Internal rotation BTB: L4     Passive Range of Motion     Right Shoulder   Flexion: 155 degrees with pain  Abduction: 165 degrees   External rotation 90°: 90 degrees   Internal rotation 90°: 80 degrees with pain    Strength/Myotome Testing     Right Shoulder     Planes of Motion   Flexion: 2+   Extension: 4   Abduction: 2+   External rotation at 0°: 4-   Internal rotation at 0°: 4       See Exercise, Manual, and Modality Logs for complete treatment.     Assessment & Plan     Assessment    Assessment details: The patient was re-evaluated today and presents with improvements in right shoulder pain, right shoulder PROM, right shoulder AROM, right shoulder strength, and function (QuickDASH) compared to her initial evalaution. Although improved, she is still limited in right shoulder strength and ROM which impact her  ADL performance. She would benefit from continued skilled physical therapy to address remaining functional deficits and to allow the patient to return to her prior level of function. She was provided with an updated HEP today that she will take with her when she goes out of town over the next week.    Goals  Plan Goals: ST-6 weeks  Pt will demonstrate/ report:     -understanding and compliance with surgical precautions. MET    -decreased c/o pain R shoulder 2-3/10, 50-75% of the time. PROGRESSING    -increased shoulder PROM to 80 degrees elevation in scaption, min/no c/o pain. MET    -increased shoulder PROM to ER to 30 MET    -increased scapular stabilization with isometric exercises as indicated per protocol. MET    -proper posture with scapular retraction. MET    -sleeping 75%, or more , of the night without waking due to shoulder pain. PROGRESSING    -I in HEP each visit.  MET    -improve score on QUICK DASH. MET      LT-12 weeks  Pt will report/ demonstrate:     -functional  PROM R shoulder with min c/o pain or stiffness.   PROGRESSING    -improved functional use of R shoulder, with appropriate scapular stabilization, to perform ADLs, hobbies, home chores with min/ no limitation . PROGRESSING    - R shoulder strength 4-4/5 to perform home tasks and community activities as needed, with min/no limitation.  PROGRESSING    -increased scapular stabilization with functional use of  UE. PROGRESSING    -resumption of hobbies with min limitations due to shoulder limitations.   PROGRESSING    -improve score on QUICK DASH  PROGRESSING     -be I in final HEP and progression parameters to perform after formal physical therapy has been discontinued PROGRESSING    Plan  Therapy options: will be seen for skilled therapy services      Progress toward previous goals: Partially Met      Recommendations: Continue as planned  Timeframe: 2 months  Prognosis to achieve goals: good    PT Signature: Alex Delacruz  PT    Electronically signed 10/4/2022    [place] License: PT - [id number]       Timed:  Manual Therapy:    8     mins  59545;  Therapeutic Exercise:    14     mins  48977;     Neuromuscular Ruben:    0    mins  91331;    Therapeutic Activity:     8     mins  69017;     Gait Trainin     mins  83933;     Aquatics                         0      mins  76633    Un-timed:  Mechanical Traction      0     mins  59577  Dry Needling     0     mins self-pay  Electrical Stimulation:    15     mins  72961 ( );    Timed Treatment:   30   mins   Total Treatment:     45   mins   Donor Site Anesthesia Type: same as repair anesthesia

## 2022-11-09 ENCOUNTER — TELEPHONE (OUTPATIENT)
Dept: PHYSICAL THERAPY | Facility: CLINIC | Age: 62
End: 2022-11-09

## 2023-02-17 ENCOUNTER — OFFICE VISIT (OUTPATIENT)
Dept: FAMILY MEDICINE CLINIC | Age: 63
End: 2023-02-17
Payer: OTHER GOVERNMENT

## 2023-02-17 ENCOUNTER — LAB (OUTPATIENT)
Dept: LAB | Facility: HOSPITAL | Age: 63
End: 2023-02-17
Payer: OTHER GOVERNMENT

## 2023-02-17 VITALS
SYSTOLIC BLOOD PRESSURE: 153 MMHG | BODY MASS INDEX: 31.38 KG/M2 | WEIGHT: 183.8 LBS | HEIGHT: 64 IN | DIASTOLIC BLOOD PRESSURE: 83 MMHG | HEART RATE: 53 BPM | TEMPERATURE: 98 F

## 2023-02-17 DIAGNOSIS — M54.50 CHRONIC RIGHT-SIDED LOW BACK PAIN WITHOUT SCIATICA: ICD-10-CM

## 2023-02-17 DIAGNOSIS — G89.29 CHRONIC RIGHT-SIDED LOW BACK PAIN WITHOUT SCIATICA: ICD-10-CM

## 2023-02-17 DIAGNOSIS — R10.32 LEFT LOWER QUADRANT ABDOMINAL PAIN: ICD-10-CM

## 2023-02-17 DIAGNOSIS — R10.32 LEFT LOWER QUADRANT ABDOMINAL PAIN: Primary | ICD-10-CM

## 2023-02-17 DIAGNOSIS — R10.2 PELVIC PAIN: ICD-10-CM

## 2023-02-17 LAB
ALBUMIN SERPL-MCNC: 3.9 G/DL (ref 3.5–5.2)
ALBUMIN/GLOB SERPL: 1.1 G/DL
ALP SERPL-CCNC: 122 U/L (ref 39–117)
ALT SERPL W P-5'-P-CCNC: 49 U/L (ref 1–33)
ANION GAP SERPL CALCULATED.3IONS-SCNC: 10.7 MMOL/L (ref 5–15)
AST SERPL-CCNC: 26 U/L (ref 1–32)
BACTERIA UR QL AUTO: NORMAL /HPF
BASOPHILS # BLD AUTO: 0.02 10*3/MM3 (ref 0–0.2)
BASOPHILS NFR BLD AUTO: 0.4 % (ref 0–1.5)
BILIRUB SERPL-MCNC: <0.2 MG/DL (ref 0–1.2)
BILIRUB UR QL STRIP: NEGATIVE
BUN SERPL-MCNC: 18 MG/DL (ref 8–23)
BUN/CREAT SERPL: 16.5 (ref 7–25)
CALCIUM SPEC-SCNC: 9.6 MG/DL (ref 8.6–10.5)
CHLORIDE SERPL-SCNC: 107 MMOL/L (ref 98–107)
CLARITY UR: CLEAR
CO2 SERPL-SCNC: 25.3 MMOL/L (ref 22–29)
COLOR UR: YELLOW
CREAT SERPL-MCNC: 1.09 MG/DL (ref 0.57–1)
DEPRECATED RDW RBC AUTO: 44.2 FL (ref 37–54)
EGFRCR SERPLBLD CKD-EPI 2021: 56.8 ML/MIN/1.73
EOSINOPHIL # BLD AUTO: 0.08 10*3/MM3 (ref 0–0.4)
EOSINOPHIL NFR BLD AUTO: 1.4 % (ref 0.3–6.2)
ERYTHROCYTE [DISTWIDTH] IN BLOOD BY AUTOMATED COUNT: 14.3 % (ref 12.3–15.4)
GLOBULIN UR ELPH-MCNC: 3.4 GM/DL
GLUCOSE SERPL-MCNC: 92 MG/DL (ref 65–99)
GLUCOSE UR STRIP-MCNC: NEGATIVE MG/DL
HCT VFR BLD AUTO: 43.5 % (ref 34–46.6)
HGB BLD-MCNC: 13.5 G/DL (ref 12–15.9)
HGB UR QL STRIP.AUTO: NEGATIVE
IMM GRANULOCYTES # BLD AUTO: 0.01 10*3/MM3 (ref 0–0.05)
IMM GRANULOCYTES NFR BLD AUTO: 0.2 % (ref 0–0.5)
KETONES UR QL STRIP: NEGATIVE
LEUKOCYTE ESTERASE UR QL STRIP.AUTO: NEGATIVE
LYMPHOCYTES # BLD AUTO: 1.8 10*3/MM3 (ref 0.7–3.1)
LYMPHOCYTES NFR BLD AUTO: 32.5 % (ref 19.6–45.3)
MCH RBC QN AUTO: 26.1 PG (ref 26.6–33)
MCHC RBC AUTO-ENTMCNC: 31 G/DL (ref 31.5–35.7)
MCV RBC AUTO: 84.1 FL (ref 79–97)
MONOCYTES # BLD AUTO: 0.69 10*3/MM3 (ref 0.1–0.9)
MONOCYTES NFR BLD AUTO: 12.5 % (ref 5–12)
NEUTROPHILS NFR BLD AUTO: 2.93 10*3/MM3 (ref 1.7–7)
NEUTROPHILS NFR BLD AUTO: 53 % (ref 42.7–76)
NITRITE UR QL STRIP: NEGATIVE
PH UR STRIP.AUTO: 5.5 [PH] (ref 5–8)
PLATELET # BLD AUTO: 340 10*3/MM3 (ref 140–450)
PMV BLD AUTO: 9.9 FL (ref 6–12)
POTASSIUM SERPL-SCNC: 4.2 MMOL/L (ref 3.5–5.2)
PROT SERPL-MCNC: 7.3 G/DL (ref 6–8.5)
PROT UR QL STRIP: NEGATIVE
RBC # BLD AUTO: 5.17 10*6/MM3 (ref 3.77–5.28)
RBC # UR STRIP: NORMAL /HPF
REF LAB TEST METHOD: NORMAL
SODIUM SERPL-SCNC: 143 MMOL/L (ref 136–145)
SP GR UR STRIP: 1.02 (ref 1–1.03)
SQUAMOUS #/AREA URNS HPF: NORMAL /HPF
UROBILINOGEN UR QL STRIP: NORMAL
WBC # UR STRIP: NORMAL /HPF
WBC NRBC COR # BLD: 5.53 10*3/MM3 (ref 3.4–10.8)

## 2023-02-17 PROCEDURE — 80053 COMPREHEN METABOLIC PANEL: CPT

## 2023-02-17 PROCEDURE — 99214 OFFICE O/P EST MOD 30 MIN: CPT | Performed by: FAMILY MEDICINE

## 2023-02-17 PROCEDURE — 85025 COMPLETE CBC W/AUTO DIFF WBC: CPT

## 2023-02-17 PROCEDURE — 81001 URINALYSIS AUTO W/SCOPE: CPT

## 2023-02-17 PROCEDURE — 36415 COLL VENOUS BLD VENIPUNCTURE: CPT

## 2023-02-17 RX ORDER — IBUPROFEN 200 MG
1 CAPSULE ORAL DAILY
COMMUNITY

## 2023-02-17 NOTE — PROGRESS NOTES
Cindy Deng presents to [facility].    Chief Complaint:  'I have this pain in my side'    Subjective       History of Present Illness:  Cindy is in today for evaluation of left sided pain.  She has had a couple of episodes of piercing pain.  She says that there is a dull pain which is there more frequently.  This bothers her when she lays down or turns over.  The pain comes and goes to some degree.  This has been going on for the last 2 to 3 weeks.  This is a daily pain for her.  She would estimate the pain to be approximately 8/10 when present.  She denies urinary symptoms and has no personal history of kidney stones.  She states her bowels are doing okay presently.  She is scheduled to have colonoscopy in about 10 days.    Review of Systems:  Review of Systems   Constitutional: Negative for chills and fever.   Respiratory: Negative for cough and shortness of breath.    Cardiovascular: Negative for chest pain and palpitations.   Gastrointestinal: Positive for abdominal pain. Negative for nausea and vomiting.        Objective   Medical History:  Past Medical History:   • Allergic   • Anxiety and depression   • Cataract   • HTN (hypertension)   • Major depressive disorder, single episode, moderate (HCC)   • Other hemorrhoids   • S/P right rotator cuff repair     Past Surgical History:   • KIA BUNIONECTOMY    5TH      Family History   Problem Relation Age of Onset   • Kidney failure Mother    • Hyperlipidemia Mother    • Colon cancer Father    • Prostate cancer Father    • Stroke Sister      Social History     Tobacco Use   • Smoking status: Former     Packs/day: 1.00     Years: 15.00     Pack years: 15.00     Types: Cigarettes     Quit date: 1997     Years since quittin.6   • Smokeless tobacco: Never   Substance Use Topics   • Alcohol use: Yes     Alcohol/week: 2.0 standard drinks     Types: 2 Glasses of wine per week     Comment: 'I DRINK WINE'       Health  "Maintenance Due   Topic Date Due   • COLORECTAL CANCER SCREENING  Never done   • TDAP/TD VACCINES (1 - Tdap) Never done   • ZOSTER VACCINE (1 of 2) Never done   • MAMMOGRAM  03/12/2016   • HEPATITIS C SCREENING  Never done   • ANNUAL PHYSICAL  Never done   • PAP SMEAR  Never done        Immunization History   Administered Date(s) Administered   • COVID-19 (MODERNA) 1st, 2nd, 3rd Dose Only 03/22/2021, 04/19/2021   • COVID-19 (MODERNA) BIVALENT BOOSTER 12+YRS 11/14/2022   • COVID-19 (MODERNA) BOOSTER 12/03/2021   • FluLaval/Fluzone >6mos 11/03/2022   • Influenza Quad Vaccine (Inpatient) 01/14/2013   • Influenza, Unspecified 10/01/2020   • Pneumococcal Conjugate 20-Valent (PCV20) 12/13/2022       Allergies   Allergen Reactions   • Lisinopril Cough        Medications:  Current Outpatient Medications on File Prior to Visit   Medication Sig   • aspirin 81 MG EC tablet Take 81 mg by mouth Daily.   • buPROPion XL (WELLBUTRIN XL) 300 MG 24 hr tablet Take 300 mg by mouth Daily.   • calcium carbonate (OS-RENNY) 1250 (500 Ca) MG tablet Take 1 tablet by mouth Daily.   • hydroCHLOROthiazide (HYDRODIURIL) 12.5 MG tablet Take 12.5 mg by mouth Daily. Every other day 25mg   • losartan (COZAAR) 100 MG tablet Take 100 mg by mouth Daily.   • traZODone (DESYREL) 100 MG tablet Take 100 mg by mouth Every Night.   • [DISCONTINUED] diclofenac (VOLTAREN) 75 MG EC tablet Take 1 tablet by mouth 2 (Two) Times a Day. Take with food.   • [DISCONTINUED] HYDROcodone-acetaminophen (Norco) 5-325 MG per tablet Take 1 tablet by mouth Every 6 (Six) Hours As Needed for Moderate Pain .     No current facility-administered medications on file prior to visit.       Vital Signs:   Vitals:    02/17/23 1052 02/17/23 1119   BP: 152/92 153/83   BP Location: Right arm Right arm   Patient Position: Sitting Sitting   Pulse: 61 53   Temp: 98 °F (36.7 °C)    TempSrc: Oral    Weight: 83.4 kg (183 lb 12.8 oz)    Height: 162.6 cm (64.02\")    Body mass index is 31.53 " kg/m².    Physical Exam:  Physical Exam  Vitals reviewed.   Constitutional:       General: She is not in acute distress.     Appearance: She is not ill-appearing.   Eyes:      Pupils: Pupils are equal, round, and reactive to light.   Neck:      Comments: No thyromegaly  Cardiovascular:      Rate and Rhythm: Normal rate and regular rhythm.   Pulmonary:      Effort: Pulmonary effort is normal.      Breath sounds: Normal breath sounds.   Abdominal:      General: There is no distension.      Palpations: Abdomen is soft.      Tenderness: There is no abdominal tenderness.   Musculoskeletal:      Cervical back: Neck supple.   Lymphadenopathy:      Cervical: No cervical adenopathy.   Skin:     Findings: No lesion or rash.   Neurological:      Mental Status: She is alert.         Result Review      The following data was reviewed by Alex Coe MD on 02/17/2023.  Lab Results   Component Value Date    WBC 4.61 04/15/2022    HGB 13.4 04/15/2022    HCT 44.0 04/15/2022    MCV 85.8 04/15/2022     04/15/2022     Lab Results   Component Value Date    GLUCOSE 89 04/15/2022    BUN 16 04/15/2022    CREATININE 1.08 (H) 04/15/2022     04/15/2022    K 4.2 04/15/2022     04/15/2022    CO2 26.0 04/15/2022    CALCIUM 9.4 04/15/2022    PROTEINTOT 6.7 04/15/2022    ALBUMIN 4.30 04/15/2022    ALT 32 04/15/2022    AST 18 04/15/2022    ALKPHOS 77 04/15/2022    BILITOT 0.2 04/15/2022    EGFR 57.8 (L) 04/15/2022    GLOB 2.4 04/15/2022    AGRATIO 1.8 04/15/2022    BCR 14.8 04/15/2022    ANIONGAP 9.0 04/15/2022      No results found for: CHOL, CHLPL, TRIG, HDL, LDL, LDLDIRECT  Lab Results   Component Value Date    TSH 0.836 02/08/2021     No results found for: HGBA1C         Assessment and Plan:   Today, we have reviewed her care.  We will move ahead with some testing as noted below.  It is unclear as to the etiology of her pain.  It could be related to some kind of strain.  However, it seems deeper than that to her.   She is up-to-date on colonoscopy and has another one planned soon.  If the urine is normal, I am leaning toward a pelvic ultrasound given how low down in the abdomen the pain is.  If the urine shows blood or other finding of concern, we will probably do CT initially.  We will see what the testing shows and then move forward from there.  She also has chronic low back pain and asked for referral to chiropractor.       Diagnoses and all orders for this visit:    1. Left lower quadrant abdominal pain (Primary)  -     CBC Auto Differential; Future  -     Urinalysis With Microscopic - Urine, Clean Catch; Future  -     Comprehensive Metabolic Panel; Future    2. Chronic right-sided low back pain without sciatica  -     Ambulatory Referral to Chiropractic    Follow Up   Return if symptoms worsen or fail to improve.  Patient was given instructions and counseling regarding her condition or for health maintenance advice. Please see specific information pulled into the AVS if appropriate.

## 2023-03-03 ENCOUNTER — HOSPITAL ENCOUNTER (OUTPATIENT)
Dept: ULTRASOUND IMAGING | Facility: HOSPITAL | Age: 63
Discharge: HOME | End: 2023-03-03
Admitting: FAMILY MEDICINE
Payer: OTHER GOVERNMENT

## 2023-03-03 DIAGNOSIS — R10.2 PELVIC PAIN: ICD-10-CM

## 2023-03-03 PROCEDURE — 76830 TRANSVAGINAL US NON-OB: CPT

## 2023-03-08 ENCOUNTER — TELEPHONE (OUTPATIENT)
Dept: FAMILY MEDICINE CLINIC | Age: 63
End: 2023-03-08
Payer: OTHER GOVERNMENT

## 2023-03-08 DIAGNOSIS — D25.9 UTERINE LEIOMYOMA, UNSPECIFIED LOCATION: ICD-10-CM

## 2023-03-08 DIAGNOSIS — R10.2 PELVIC PAIN: Primary | ICD-10-CM

## 2023-03-08 NOTE — TELEPHONE ENCOUNTER
Pt called stating she would like a referral to [facility], referral placed please sign.

## 2023-03-10 NOTE — TELEPHONE ENCOUNTER
REFERRAL HAS BEEN STARTED, WAITING ON  TO CHANGE BEN POLO MD AS PCM IN THEIR SYSTEM. THEY STATE IS CAN TAKE 72 HRS

## 2023-06-01 ENCOUNTER — TELEPHONE (OUTPATIENT)
Dept: FAMILY MEDICINE CLINIC | Age: 63
End: 2023-06-01

## 2023-06-01 DIAGNOSIS — H53.9 VISION CHANGES: Primary | ICD-10-CM

## 2023-06-01 NOTE — TELEPHONE ENCOUNTER
Pt requesting referral to her eye doctor- Dr. Power. She would like to discuss Lasix surgery. Order placed, please sign

## 2024-09-25 ENCOUNTER — DOCUMENTATION (OUTPATIENT)
Dept: PHYSICAL THERAPY | Facility: CLINIC | Age: 64
End: 2024-09-25
Payer: OTHER GOVERNMENT